# Patient Record
Sex: MALE | Race: WHITE | Employment: FULL TIME | URBAN - METROPOLITAN AREA
[De-identification: names, ages, dates, MRNs, and addresses within clinical notes are randomized per-mention and may not be internally consistent; named-entity substitution may affect disease eponyms.]

---

## 2017-01-05 ENCOUNTER — HOSPITAL ENCOUNTER (OUTPATIENT)
Dept: PHYSICAL THERAPY | Age: 62
Discharge: HOME | End: 2017-01-05
Payer: COMMERCIAL

## 2017-01-05 PROCEDURE — 97161 PT EVAL LOW COMPLEX 20 MIN: CPT

## 2017-01-05 NOTE — PROGRESS NOTES
Simeon Jeffery  : 1955 [facility] at [facility] DEONTE GARCIA  [address] [place] [address], [address] [place] [address],[address], [address] [facility]  [phone]   [phone]          OUTPATIENT PHYSICAL THERAPY:Initial Assessment 2017    ICD-10: Treatment Diagnosis: Nondisplaced fracture of right tibial tuberosity, sequela (S82.154S)  Precautions/Allergies:   Pcn [penicillins]   Fall Risk Score: 4  MD Orders: ROM, gentle quad sets, NWB R LE MEDICAL/REFERRING DIAGNOSIS:Status post fracture R tibial plateau  right tibia fx   DATE OF ONSET: 16  REFERRING PHYSICIAN: Claritza Skinner MD  RETURN PHYSICIAN APPOINTMENT:      INITIAL ASSESSMENT:  Mr. Frederick Guadalupe presents in unlocked knee brace on crutches NWB on R after a fall 16. He sustained a non-displaced tibia fracture at that time. He will be NWB for approximately 12 weeks. He will benefit from therapy to return to function due to problems listed below. Mr Frederick Guadalupe has an insurance allowance of 20 visits per year so it will be challenging for him to rehabilitate from this problem within those visits. It will require that he perform much of his exercise and activities on his own. The initial phase- until his weight bearing status changes is focused on preventing motion loss in the joints of his LE, maintaining the muscle length and slowing the muscle atrophy associated with NWB. Reaching these goals initially will expedite the long term goals. PROBLEM LIST (Impacting functional limitations):  1. Decreased Strength  2. Decreased Transfer Abilities  3. Decreased Ambulation Ability/Technique  4. Increased Pain  5. Decreased Flexibility/Joint Mobility INTERVENTIONS PLANNED:  1. Balance Exercise  2. Gait Training  3. Home Exercise Program (HEP)  4.  Therapeutic Exercise/Strengthening   TREATMENT PLAN:  Effective Dates: 17 TO 17  Frequency/Duration: 2 times a week for 2 weeks  GOALS: (Goals have been discussed and agreed upon with patient.)  Short-Term Functional Goals: Time Frame:  4 weeks  1. ROM Knee 0-120  2. Independent with HEP to address deficits. Discharge Goals: Time Frame: 4-6 months  1. Return to FWB ambulation without assistive device. 2. Strength in LE WNL to support gait and other activities. 3. ROM WNL to allow for return to all function. 4. Improved LEFS by 10 points or greater indicating improved function. Rehabilitation Potential For Stated Goals: Good  Regarding [address] [place] [address] therapy, I certify that the treatment plan above will be carried out by a therapist or under their direction. Thank you for this referral,  Andrade Sanchez PT     Referring Physician Signature: Raymon Jonas MD              Date                    HISTORY:   History of Present Injury/Illness (Reason for Referral):  Mr Claudia Anna sustained a non displaced tibial plateau fracture with a fall from a ladder 12/11/16. He has been NWB on crutches since that time. He does well with his wt bearing status and states he has not had much difficulty controlling his pain. Past Medical History/Comorbidities: cancer in the past  Social History/Living Environment:    Lives with his wife in a ranch style house with no stairs that he has to use. It does have significant acreage that he is anxious to get back to being active in. Prior Level of Function/Work/Activity:  Independent in all activities, active in his home and yard, exercises at the [facility] several times per week. Current Medications:  Claritin      Date Last Reviewed:  1/5/17   Number of Personal Factors/Comorbidities that affect the Plan of Care: 0: LOW COMPLEXITY   EXAMINATION:   Observation/Orthostatic Postural Assessment:          Swelling in LE - R knee circ- 42.5 cm     L   38.3 cm  ROM:          5- 85 degrees  Strength:          Not tested due to recent fracture. Able to perform quad sets, heel slides,   Functional Mobility:         Gait/Ambulation:  Ambulating with crutches in brace that is unlocked NWB. Balance:          Independent with NWB ambulation   Body Structures Involved:  1. Bones  2. Joints  3. Muscles  4. Ligaments Body Functions Affected:  1. Neuromusculoskeletal  2. Movement Related Activities and Participation Affected:  1. Mobility   Number of elements that affect the Plan of Care: 4+: HIGH COMPLEXITY   CLINICAL PRESENTATION:   Presentation: Stable and uncomplicated: LOW COMPLEXITY   CLINICAL DECISION MAKING:   Outcome Measure: Tool Used: Lower Extremity Functional Scale (LEFS)  Score:  Initial: 16/80 Most Recent: X/80 (Date: -- )   Interpretation of Score: 20 questions each scored on a 5 point scale with 0 representing \"extreme difficulty or unable to perform\" and 4 representing \"no difficulty\". The lower the score, the greater the functional disability. 80/80 represents no disability. Minimal detectable change is 9 points. Score 80 79-63 62-48 47-32 31-16 15-1 0   Modifier CH CI CJ CK CL CM CN         Medical Necessity:   · Patient is expected to demonstrate progress in strength, range of motion, balance, coordination and functional technique to return to independent gait and activities. .  Reason for Services/Other Comments:  · Pt will be NWB x 12 weeks and will require skilled physical therapy to advance wt bearing and return strength and ROM to previous level. .   Use of outcome tool(s) and clinical judgement create a POC that gives a: Clear prediction of patient's progress: LOW COMPLEXITY   TREATMENT:   (In addition to Assessment/Re-Assessment sessions the following treatments were rendered)  THERAPEUTIC EXERCISE: (15 minutes):  Exercises per grid below to improve mobility and strength. Required moderate visual, verbal and tactile cues to promote proper body alignment, promote proper body posture and promote proper body mechanics. Progressed repetitions as indicated.    Date:   Date:   Date:     Activity/Exercise Parameters Parameters Parameters   Dorsiflex stretch with towel 3 x 20 Heel slide in brace 10     quads 15 x 5 sec     Ankle pumps 15     ciricles 10                     Treatment/Session Assessment:  Pt did well with exercises. Expressed understanding. · Pre-treatment Symptoms:  Min pain in LE  · Pain: Initial:    3/10 pain Post Session:  3/10 pain ·   Compliance with Program/Exercises: Will assess as treatment progresses. · Recommendations/Intent for next treatment session: \"Next visit will focus on ROM and strengthening to prevent decreasing ROM and atrophy. \".   Total Treatment Duration:  60 min       Chad Eric PT

## 2017-01-09 NOTE — PROGRESS NOTES
Ambulatory/Rehab Services H2 Model Falls Risk Assessment    Risk Factor Pts. ·   Confusion/Disorientation/Impulsivity  []    4 ·   Symptomatic Depression  []   2 ·   Altered Elimination  []   1 ·   Dizziness/Vertigo  []   1 ·   Gender (Male)  []   1 ·   Any administered antiepileptics (anticonvulsants):  []   2 ·   Any administered benzodiazepines:  []   1 ·   Visual Impairment (specify):  []   1 ·   Portable Oxygen Use  []   1 ·   Orthostatic ? BP  []   1 ·   History of Recent Falls (within 3 mos.)  [x]   5     Ability to Rise from Chair (choose one) Pts. ·   Ability to rise in a single movement  []   0 ·   Pushes up, successful in one attempt  [x]   1 ·   Multiple attempts, but successful  []   3 ·   Unable to rise without assistance  []   4   Total: (5 or greater = High Risk) 6     Falls Prevention Plan:   []                Physical Limitations to Exercise (specify):   []                Mobility Assistance Device (type):   []                Exercise/Equipment Adaptation (specify):    ©2010 [facility] Patent #[id number].  Federal Law prohibits the replication, distribution or use without written permission from [facility]

## 2017-01-10 ENCOUNTER — HOSPITAL ENCOUNTER (OUTPATIENT)
Dept: PHYSICAL THERAPY | Age: 62
Discharge: HOME | End: 2017-01-10
Payer: COMMERCIAL

## 2017-01-10 NOTE — PROGRESS NOTES
Yazan Osei  : 1955 [facility] at [facility] DEONTE GARCIA  [address] [facility], [address] [place] [address],[address], [address] [facility]  [phone]   [phone]          OUTPATIENT PHYSICAL THERAPY:Daily Note 1/10/2017    ICD-10: Treatment Diagnosis: Nondisplaced fracture of right tibial tuberosity, sequela (S82.154S)  Precautions/Allergies:   Pcn [penicillins]   Fall Risk Score: 4  MD Orders: ROM, gentle quad sets, NWB R LE MEDICAL/REFERRING DIAGNOSIS:Status post fracture R tibial plateau  right tibia fx   DATE OF ONSET: 16  REFERRING PHYSICIAN: Aure Domínguez MD  RETURN PHYSICIAN APPOINTMENT:      INITIAL ASSESSMENT:  Mr. Phillip Escudero presents in unlocked knee brace on crutches NWB on R after a fall 16. He sustained a non-displaced tibia fracture at that time. He will be NWB for approximately 12 weeks. He will benefit from therapy to return to function due to problems listed below. Mr Phillip Escudero has an insurance allowance of 20 visits per year so it will be challenging for him to rehabilitate from this problem within those visits. It will require that he perform much of his exercise and activities on his own. The initial phase- until his weight bearing status changes is focused on preventing motion loss in the joints of his LE, maintaining the muscle length and slowing the muscle atrophy associated with NWB. Reaching these goals initially will expedite the long term goals. PROBLEM LIST (Impacting functional limitations):  1. Decreased Strength  2. Decreased Transfer Abilities  3. Decreased Ambulation Ability/Technique  4. Increased Pain  5. Decreased Flexibility/Joint Mobility INTERVENTIONS PLANNED:  1. Balance Exercise  2. Gait Training  3. Home Exercise Program (HEP)  4.  Therapeutic Exercise/Strengthening   TREATMENT PLAN:  Effective Dates: 17 TO 17  Frequency/Duration: 2 times a week for 2 weeks  GOALS: (Goals have been discussed and agreed upon with patient.)  Short-Term Functional Goals: Time Frame:  4 weeks  1. ROM Knee 0-120  2. Independent with HEP to address deficits. Discharge Goals: Time Frame: 4-6 months  1. Return to FWB ambulation without assistive device. 2. Strength in LE WNL to support gait and other activities. 3. ROM WNL to allow for return to all function. 4. Improved LEFS by 10 points or greater indicating improved function. Rehabilitation Potential For Stated Goals: Good  Regarding [address] [place] [address] therapy, I certify that the treatment plan above will be carried out by a therapist or under their direction. Thank you for this referral,  Sara Hull PT     Referring Physician Signature: Mehnaz Abbasi MD              Date                    HISTORY:   History of Present Injury/Illness (Reason for Referral):  Mr Xander Daniels sustained a non displaced tibial plateau fracture with a fall from a ladder 12/11/16. He has been NWB on crutches since that time. He does well with his wt bearing status and states he has not had much difficulty controlling his pain. Past Medical History/Comorbidities: cancer in the past  Social History/Living Environment:    Lives with his wife in a ranch style house with no stairs that he has to use. It does have significant acreage that he is anxious to get back to being active in. Prior Level of Function/Work/Activity:  Independent in all activities, active in his home and yard, exercises at the [facility] several times per week. Current Medications:  Claritin    Date Last Reviewed:  1/5/17   Number of Personal Factors/Comorbidities that affect the Plan of Care: 0: LOW COMPLEXITY   EXAMINATION:   Observation/Orthostatic Postural Assessment:          Swelling in LE - R knee circ- 42.5 cm     L   38.3 cm  ROM:          5- 85 degrees  Strength:          Not tested due to recent fracture. Able to perform quad sets, heel slides,   Functional Mobility:         Gait/Ambulation:  Ambulating with crutches in brace that is unlocked NWB.     Balance: Independent with NWB ambulation   Body Structures Involved:  1. Bones  2. Joints  3. Muscles  4. Ligaments Body Functions Affected:  1. Neuromusculoskeletal  2. Movement Related Activities and Participation Affected:  1. Mobility   Number of elements that affect the Plan of Care: 4+: HIGH COMPLEXITY   CLINICAL PRESENTATION:   Presentation: Stable and uncomplicated: LOW COMPLEXITY   CLINICAL DECISION MAKING:   Outcome Measure: Tool Used: Lower Extremity Functional Scale (LEFS)  Score:  Initial: 16/80 Most Recent: X/80 (Date: -- )   Interpretation of Score: 20 questions each scored on a 5 point scale with 0 representing \"extreme difficulty or unable to perform\" and 4 representing \"no difficulty\". The lower the score, the greater the functional disability. 80/80 represents no disability. Minimal detectable change is 9 points. Score 80 79-63 62-48 47-32 31-16 15-1 0   Modifier CH CI CJ CK CL CM CN     Medical Necessity:   · Patient is expected to demonstrate progress in strength, range of motion, balance, coordination and functional technique to return to independent gait and activities. .  Reason for Services/Other Comments:  · Pt will be NWB x 12 weeks and will require skilled physical therapy to advance wt bearing and return strength and ROM to previous level. .   Use of outcome tool(s) and clinical judgement create a POC that gives a: Clear prediction of patient's progress: LOW COMPLEXITY   TREATMENT:   (In addition to Assessment/Re-Assessment sessions the following treatments were rendered)    THERAPEUTIC EXERCISE: (15 minutes):  Exercises per grid below to improve mobility and strength. Required moderate visual, verbal and tactile cues to promote proper body alignment, promote proper body posture and promote proper body mechanics. Progressed repetitions as indicated. Subjective:  Pt states he continues to do well. He has been doing his exercises and has minimal discomfort with them.        Date: Date:  1/10 Date:     Activity/Exercise Parameters Parameters Parameters   Dorsiflex stretch with towel 3 x 20     Heel slide in brace 10 25    quads 15 x 5 sec     Ankle pumps 15 30    ciricles 10     Ankle 4 way with theraband  Blue band 30x    SLR 4 ways  25x    Prone HS curl  25x    Seated LAQ  25x                                Treatment/Session Assessment:  Pt did well with exercises. Given written HEP with ex above. Pt expressed understanding of ex and wt bearing status. He also stated he had been coming out of his brace but he understands now that he is to stay in the brace for exercises. · Pre-treatment Symptoms:  Min pain in LE  · Pain: Initial:  Pain Intensity 1: 2 (2/10 after session) 3/10 pain Post Session:  3/10 pain ·   Compliance with Program/Exercises: Will assess as treatment progresses. · Recommendations/Intent for next treatment session: \"Next visit will focus on ROM and strengthening to prevent decreasing ROM and atrophy. \".   Total Treatment Duration:  45 min  PT Patient Time In/Time Out  Time In: 0800  Time Out: 1042    Adina Blood, PT

## 2017-01-12 ENCOUNTER — HOSPITAL ENCOUNTER (OUTPATIENT)
Dept: PHYSICAL THERAPY | Age: 62
Discharge: HOME | End: 2017-01-12
Payer: COMMERCIAL

## 2017-01-12 PROCEDURE — 97110 THERAPEUTIC EXERCISES: CPT

## 2017-01-12 NOTE — PROGRESS NOTES
Thuy Class  : 1955 Therapy Center at [facility] DEONTE GARCIA  [address] [facility], [address] [place] [address],[address], [facility] [address]  [phone]   [phone]          OUTPATIENT PHYSICAL THERAPY:Daily Note 2017    ICD-10: Treatment Diagnosis: Nondisplaced fracture of right tibial tuberosity, sequela (S82.154S)  Precautions/Allergies:   Pcn [penicillins]   Fall Risk Score: 4  MD Orders: ROM, gentle quad sets, NWB R LE MEDICAL/REFERRING DIAGNOSIS:Status post fracture R tibial plateau  right tibia fx   DATE OF ONSET: 16  REFERRING PHYSICIAN: Tata Prieto MD  RETURN PHYSICIAN APPOINTMENT:      INITIAL ASSESSMENT:  Mr. Bela Haider presents in unlocked knee brace on crutches NWB on R after a fall 16. He sustained a non-displaced tibia fracture at that time. He will be NWB for approximately 12 weeks. He will benefit from therapy to return to function due to problems listed below. Mr Bela Haider has an insurance allowance of 20 visits per year so it will be challenging for him to rehabilitate from this problem within those visits. It will require that he perform much of his exercise and activities on his own. The initial phase- until his weight bearing status changes is focused on preventing motion loss in the joints of his LE, maintaining the muscle length and slowing the muscle atrophy associated with NWB. Reaching these goals initially will expedite the long term goals. PROBLEM LIST (Impacting functional limitations):  1. Decreased Strength  2. Decreased Transfer Abilities  3. Decreased Ambulation Ability/Technique  4. Increased Pain  5. Decreased Flexibility/Joint Mobility INTERVENTIONS PLANNED:  1. Balance Exercise  2. Gait Training  3. Home Exercise Program (HEP)  4.  Therapeutic Exercise/Strengthening   TREATMENT PLAN:  Effective Dates: 17 TO 17  Frequency/Duration: 2 times a week for 2 weeks  GOALS: (Goals have been discussed and agreed upon with patient.)  Short-Term Functional Goals: Time Frame:  4 weeks  1. ROM Knee 0-120  2. Independent with HEP to address deficits. Discharge Goals: Time Frame: 4-6 months  1. Return to FWB ambulation without assistive device. 2. Strength in LE WNL to support gait and other activities. 3. ROM WNL to allow for return to all function. 4. Improved LEFS by 10 points or greater indicating improved function. Rehabilitation Potential For Stated Goals: Good  Regarding [address] [place] [address] therapy, I certify that the treatment plan above will be carried out by a therapist or under their direction. Thank you for this referral,  Devyn Acevedo PT     Referring Physician Signature: Shavonne Delgado MD              Date                    HISTORY:   History of Present Injury/Illness (Reason for Referral):  Mr Gabriel Tapia sustained a non displaced tibial plateau fracture with a fall from a ladder 12/11/16. He has been NWB on crutches since that time. He does well with his wt bearing status and states he has not had much difficulty controlling his pain. Past Medical History/Comorbidities: cancer in the past  Social History/Living Environment:    Lives with his wife in a ranch style house with no stairs that he has to use. It does have significant acreage that he is anxious to get back to being active in. Prior Level of Function/Work/Activity:  Independent in all activities, active in his home and yard, exercises at the [facility] several times per week. Current Medications:  Claritin    Date Last Reviewed:  1/12/17   Number of Personal Factors/Comorbidities that affect the Plan of Care: 0: LOW COMPLEXITY   EXAMINATION:   Observation/Orthostatic Postural Assessment:          Swelling in LE - R knee circ- 42.5 cm     L   38.3 cm  ROM:          5- 85 degrees  Strength:          Not tested due to recent fracture. Able to perform quad sets, heel slides,   Functional Mobility:         Gait/Ambulation:  Ambulating with crutches in brace that is unlocked NWB.     Balance: Independent with NWB ambulation   Body Structures Involved:  1. Bones  2. Joints  3. Muscles  4. Ligaments Body Functions Affected:  1. Neuromusculoskeletal  2. Movement Related Activities and Participation Affected:  1. Mobility   Number of elements that affect the Plan of Care: 4+: HIGH COMPLEXITY   CLINICAL PRESENTATION:   Presentation: Stable and uncomplicated: LOW COMPLEXITY   CLINICAL DECISION MAKING:   Outcome Measure: Tool Used: Lower Extremity Functional Scale (LEFS)  Score:  Initial: 16/80 Most Recent: X/80 (Date: -- )   Interpretation of Score: 20 questions each scored on a 5 point scale with 0 representing \"extreme difficulty or unable to perform\" and 4 representing \"no difficulty\". The lower the score, the greater the functional disability. 80/80 represents no disability. Minimal detectable change is 9 points. Score 80 79-63 62-48 47-32 31-16 15-1 0   Modifier CH CI CJ CK CL CM CN     Medical Necessity:   · Patient is expected to demonstrate progress in strength, range of motion, balance, coordination and functional technique to return to independent gait and activities. .  Reason for Services/Other Comments:  · Pt will be NWB x 12 weeks and will require skilled physical therapy to advance wt bearing and return strength and ROM to previous level. .   Use of outcome tool(s) and clinical judgement create a POC that gives a: Clear prediction of patient's progress: LOW COMPLEXITY   TREATMENT:   (In addition to Assessment/Re-Assessment sessions the following treatments were rendered)    THERAPEUTIC EXERCISE: (15 minutes):  Exercises per grid below to improve mobility and strength. Required moderate visual, verbal and tactile cues to promote proper body alignment, promote proper body posture and promote proper body mechanics. Progressed repetitions as indicated. Subjective:  Pt states he is doing his exercises at home and they are getting a bit easier.   No real pain       Date:   Date:  1/10 Date:  1/12   Activity/Exercise Parameters Parameters Parameters   Dorsiflex stretch with towel 3 x 20     Heel slide in brace 10 25 25   quads 15 x 5 sec  25   Ankle pumps 15 30    ciricles 10     Ankle 4 way with theraband  Blue band 30x Blue band 30x   SLR 4 ways  25x 25x 2# with ext   Prone HS curl  25x 25# 2#   Seated LAQ  25x 25   Seated march   15   Hip add in hooklying   20                   Treatment/Session Assessment:  Pt did well with exercises. He continues to be compliant with HEP and wt bearing status. · Pre-treatment Symptoms:  Min pain in LE  · Pain: Initial:  Pain Intensity 1: 2 (2/10 after session) 2/10 pain Post Session: 2/10 pain ·   Compliance with Program/Exercises: Will assess as treatment progresses. · Recommendations/Intent for next treatment session: \"Next visit will focus on ROM and strengthening to prevent decreasing ROM and atrophy. \".   Total Treatment Duration:  45 min  PT Patient Time In/Time Out  Time In: 0800  Time Out: 1614    Kory Golden PT

## 2017-01-17 ENCOUNTER — HOSPITAL ENCOUNTER (OUTPATIENT)
Dept: PHYSICAL THERAPY | Age: 62
Discharge: HOME | End: 2017-01-17
Payer: COMMERCIAL

## 2017-01-17 PROCEDURE — 97110 THERAPEUTIC EXERCISES: CPT

## 2017-01-17 NOTE — PROGRESS NOTES
Sheryle Miguel  : 1955 [facility] DEONTE GARCIA  1101 [facility], [address] [place] [address],[address], [place] [address].  [phone]   [phone]          OUTPATIENT PHYSICAL THERAPY:Daily Note 2017    ICD-10: Treatment Diagnosis: Nondisplaced fracture of right tibial tuberosity, sequela (S82.154S)  Precautions/Allergies:   Pcn [penicillins]   Fall Risk Score: 4  MD Orders: ROM, gentle quad sets, NWB R LE MEDICAL/REFERRING DIAGNOSIS:Status post fracture R tibial plateau  right tibia fx   DATE OF ONSET: 16  REFERRING PHYSICIAN: Juana Myers MD  RETURN PHYSICIAN APPOINTMENT:      INITIAL ASSESSMENT:  Mr. Nisa Rokc presents in unlocked knee brace on crutches NWB on R after a fall 16. He sustained a non-displaced tibia fracture at that time. He will be NWB for approximately 12 weeks. He will benefit from therapy to return to function due to problems listed below. Mr Nisa Rock has an insurance allowance of 20 visits per year so it will be challenging for him to rehabilitate from this problem within those visits. It will require that he perform much of his exercise and activities on his own. The initial phase- until his weight bearing status changes is focused on preventing motion loss in the joints of his LE, maintaining the muscle length and slowing the muscle atrophy associated with NWB. Reaching these goals initially will expedite the long term goals. PROBLEM LIST (Impacting functional limitations):  1. Decreased Strength  2. Decreased Transfer Abilities  3. Decreased Ambulation Ability/Technique  4. Increased Pain  5. Decreased Flexibility/Joint Mobility INTERVENTIONS PLANNED:  1. Balance Exercise  2. Gait Training  3. Home Exercise Program (HEP)  4.  Therapeutic Exercise/Strengthening   TREATMENT PLAN:  Effective Dates: 17 TO 17  Frequency/Duration: 2 times a week for 2 weeks  GOALS: (Goals have been discussed and agreed upon with patient.)  Short-Term Functional Goals: Time Frame:  4 weeks  1. ROM Knee 0-120  2. Independent with HEP to address deficits. Discharge Goals: Time Frame: 4-6 months  1. Return to FWB ambulation without assistive device. 2. Strength in LE WNL to support gait and other activities. 3. ROM WNL to allow for return to all function. 4. Improved LEFS by 10 points or greater indicating improved function. Rehabilitation Potential For Stated Goals: Good  Regarding [address] [place] [address] therapy, I certify that the treatment plan above will be carried out by a therapist or under their direction. Thank you for this referral,  Veronica Jarvsi PT     Referring Physician Signature: Juana Myers MD              Date                    HISTORY:   History of Present Injury/Illness (Reason for Referral):  Mr Nisa Rock sustained a non displaced tibial plateau fracture with a fall from a ladder 12/11/16. He has been NWB on crutches since that time. He does well with his wt bearing status and states he has not had much difficulty controlling his pain. Past Medical History/Comorbidities: cancer in the past  Social History/Living Environment:    Lives with his wife in a ranch style house with no stairs that he has to use. It does have significant acreage that he is anxious to get back to being active in. Prior Level of Function/Work/Activity:  Independent in all activities, active in his home and yard, exercises at the [facility] several times per week. Current Medications:  Claritin    Date Last Reviewed:  1/17/17   Number of Personal Factors/Comorbidities that affect the Plan of Care: 0: LOW COMPLEXITY   EXAMINATION:   Observation/Orthostatic Postural Assessment:          Swelling in LE - R knee circ- 42.5 cm     L   38.3 cm  ROM:          5- 85 degrees  Strength:          Not tested due to recent fracture. Able to perform quad sets, heel slides,   Functional Mobility:         Gait/Ambulation:  Ambulating with crutches in brace that is unlocked NWB.     Balance: Independent with NWB ambulation   Body Structures Involved:  1. Bones  2. Joints  3. Muscles  4. Ligaments Body Functions Affected:  1. Neuromusculoskeletal  2. Movement Related Activities and Participation Affected:  1. Mobility   Number of elements that affect the Plan of Care: 4+: HIGH COMPLEXITY   CLINICAL PRESENTATION:   Presentation: Stable and uncomplicated: LOW COMPLEXITY   CLINICAL DECISION MAKING:   Outcome Measure: Tool Used: Lower Extremity Functional Scale (LEFS)  Score:  Initial: 16/80 Most Recent: X/80 (Date: -- )   Interpretation of Score: 20 questions each scored on a 5 point scale with 0 representing \"extreme difficulty or unable to perform\" and 4 representing \"no difficulty\". The lower the score, the greater the functional disability. 80/80 represents no disability. Minimal detectable change is 9 points. Score 80 79-63 62-48 47-32 31-16 15-1 0   Modifier CH CI CJ CK CL CM CN     Medical Necessity:   · Patient is expected to demonstrate progress in strength, range of motion, balance, coordination and functional technique to return to independent gait and activities. .  Reason for Services/Other Comments:  · Pt will be NWB x 12 weeks and will require skilled physical therapy to advance wt bearing and return strength and ROM to previous level. .   Use of outcome tool(s) and clinical judgement create a POC that gives a: Clear prediction of patient's progress: LOW COMPLEXITY   TREATMENT:   (In addition to Assessment/Re-Assessment sessions the following treatments were rendered)    THERAPEUTIC EXERCISE: (45 minutes):  Exercises per grid below to improve mobility and strength. Required moderate visual, verbal and tactile cues to promote proper body alignment, promote proper body posture and promote proper body mechanics. Progressed repetitions as indicated. Subjective:  Pt states he continues to be compliant with HEP and is having minimal to no pain.        Date:   Date:  1/10 Date:  1/12 Date  1/17   Activity/Exercise Parameters Parameters Parameters    Dorsiflex stretch with towel 3 x 20   3 x 30 sec   Heel slide in brace 10 25 25    quads 15 x 5 sec  25 25x   Ankle pumps 15 30     ciricles 10      Ankle 4 way with theraband  Blue band 30x Blue band 30x Blue band 40x   SLR 4 ways  25x 25x 2# with ext 2.5 # 40x   Prone HS curl  25x 25# 2# 2.5# 40x   Seated LAQ  25x 25 2.5# 40x   Seated march   15 2.5# 25x   Hip add in hooklying   20    Quadruped oppos extrem ext for R LE    15x   HS stretch with strap    5 x 30 sec       Treatment/Session Assessment:  Pt did well with exercises. He continues to be compliant with HEP and wt bearing status. · Pre-treatment Symptoms:  Min pain in LE  · Pain: Initial:  Pain Intensity 1: 0 (0/10 at end of session) 0/10 pain Post Session: 0/10 pain ·   Compliance with Program/Exercises: Will assess as treatment progresses. · Recommendations/Intent for next treatment session: \"Next visit will focus on ROM and strengthening to prevent decreasing ROM and atrophy. \".   Total Treatment Duration:  45 min  PT Patient Time In/Time Out  Time In: 0800  Time Out: 3573    Danni Pino, PT

## 2017-01-19 ENCOUNTER — HOSPITAL ENCOUNTER (OUTPATIENT)
Dept: PHYSICAL THERAPY | Age: 62
Discharge: HOME | End: 2017-01-19
Payer: COMMERCIAL

## 2017-01-19 NOTE — PROGRESS NOTES
Pt cancelled appointment in consultation with PT in order to conserve his appointments as he has 20 visits covered by his insurance per year. He is independent with HEP and will be seen on Tuesday to monitor ROM and progress.

## 2017-01-24 ENCOUNTER — HOSPITAL ENCOUNTER (OUTPATIENT)
Dept: PHYSICAL THERAPY | Age: 62
Discharge: HOME | End: 2017-01-24
Payer: COMMERCIAL

## 2017-01-24 PROCEDURE — 97110 THERAPEUTIC EXERCISES: CPT

## 2017-01-24 NOTE — PROGRESS NOTES
Dominik Para  : 1955 [facility] at [facility] DEONTE GARCIA  [address] [facility], [address] [place] [address],[address], [place] [address].  [phone]   [phone]          OUTPATIENT PHYSICAL THERAPY:Daily Note 2017    ICD-10: Treatment Diagnosis: Nondisplaced fracture of right tibial tuberosity, sequela (S82.154S)  Precautions/Allergies:   Pcn [penicillins]   Fall Risk Score: 4  MD Orders: ROM, gentle quad sets, NWB R LE MEDICAL/REFERRING DIAGNOSIS:Status post fracture R tibial plateau  right tibia fx   DATE OF ONSET: 16  REFERRING PHYSICIAN: Anjali Aquino MD  RETURN PHYSICIAN APPOINTMENT:      INITIAL ASSESSMENT:  Mr. Reuben Burton presents in unlocked knee brace on crutches NWB on R after a fall 16. He sustained a non-displaced tibia fracture at that time. He will be NWB for approximately 12 weeks. He will benefit from therapy to return to function due to problems listed below. Mr Reuben Burton has an insurance allowance of 20 visits per year so it will be challenging for him to rehabilitate from this problem within those visits. It will require that he perform much of his exercise and activities on his own. The initial phase- until his weight bearing status changes is focused on preventing motion loss in the joints of his LE, maintaining the muscle length and slowing the muscle atrophy associated with NWB. Reaching these goals initially will expedite the long term goals. PROBLEM LIST (Impacting functional limitations):  1. Decreased Strength  2. Decreased Transfer Abilities  3. Decreased Ambulation Ability/Technique  4. Increased Pain  5. Decreased Flexibility/Joint Mobility INTERVENTIONS PLANNED:  1. Balance Exercise  2. Gait Training  3. Home Exercise Program (HEP)  4.  Therapeutic Exercise/Strengthening   TREATMENT PLAN:  Effective Dates: 17 TO 17  Frequency/Duration: 2 times a week for 2 weeks  GOALS: (Goals have been discussed and agreed upon with patient.)  Short-Term Functional Goals: Time Frame:  4 weeks  1. ROM Knee 0-120  2. Independent with HEP to address deficits. Discharge Goals: Time Frame: 4-6 months  1. Return to FWB ambulation without assistive device. 2. Strength in LE WNL to support gait and other activities. 3. ROM WNL to allow for return to all function. 4. Improved LEFS by 10 points or greater indicating improved function. Rehabilitation Potential For Stated Goals: Good  Regarding [address] [place] [address] therapy, I certify that the treatment plan above will be carried out by a therapist or under their direction. Thank you for this referral,  Devyn Acevedo PT     Referring Physician Signature: Shavonne Delgado MD              Date                    HISTORY:   History of Present Injury/Illness (Reason for Referral):  Mr Gabriel Tapia sustained a non displaced tibial plateau fracture with a fall from a ladder 12/11/16. He has been NWB on crutches since that time. He does well with his wt bearing status and states he has not had much difficulty controlling his pain. Past Medical History/Comorbidities: cancer in the past  Social History/Living Environment:    Lives with his wife in a ranch style house with no stairs that he has to use. It does have significant acreage that he is anxious to get back to being active in. Prior Level of Function/Work/Activity:  Independent in all activities, active in his home and yard, exercises at the [facility] several times per week. Current Medications:  Claritin    Date Last Reviewed:  1/24/17   Number of Personal Factors/Comorbidities that affect the Plan of Care: 0: LOW COMPLEXITY   EXAMINATION:   Observation/Orthostatic Postural Assessment:          Swelling in LE - R knee circ- 42.5 cm     L   38.3 cm  ROM:          3-120 degrees- 1/24/17  Strength:          Not tested due to recent fracture. Able to perform quad sets, heel slides,   Functional Mobility:         Gait/Ambulation:  Ambulating with crutches in brace that is unlocked NWB. Balance:          Independent with NWB ambulation   Body Structures Involved:  1. Bones  2. Joints  3. Muscles  4. Ligaments Body Functions Affected:  1. Neuromusculoskeletal  2. Movement Related Activities and Participation Affected:  1. Mobility   Number of elements that affect the Plan of Care: 4+: HIGH COMPLEXITY   CLINICAL PRESENTATION:   Presentation: Stable and uncomplicated: LOW COMPLEXITY   CLINICAL DECISION MAKING:   Outcome Measure: Tool Used: Lower Extremity Functional Scale (LEFS)  Score:  Initial: 16/80 Most Recent: X/80 (Date: -- )   Interpretation of Score: 20 questions each scored on a 5 point scale with 0 representing \"extreme difficulty or unable to perform\" and 4 representing \"no difficulty\". The lower the score, the greater the functional disability. 80/80 represents no disability. Minimal detectable change is 9 points. Score 80 79-63 62-48 47-32 31-16 15-1 0   Modifier CH CI CJ CK CL CM CN     Medical Necessity:   · Patient is expected to demonstrate progress in strength, range of motion, balance, coordination and functional technique to return to independent gait and activities. .  Reason for Services/Other Comments:  · Pt will be NWB x 12 weeks and will require skilled physical therapy to advance wt bearing and return strength and ROM to previous level. .   Use of outcome tool(s) and clinical judgement create a POC that gives a: Clear prediction of patient's progress: LOW COMPLEXITY   TREATMENT:   (In addition to Assessment/Re-Assessment sessions the following treatments were rendered)    THERAPEUTIC EXERCISE: (45 minutes):  Exercises per grid below to improve mobility and strength. Required minimal visual and verbal cues to promote proper body alignment, promote proper body posture and promote proper body mechanics. Progressed resistance, range and repetitions as indicated. Subjective:  Pt states he is using a 2# wt at home for his HEP.          Date:   Date:  1/10 Date:  1/12 Date  1/17 Date  1/24   Activity/Exercise Parameters Parameters Parameters     Dorsiflex stretch with towel 3 x 20   3 x 30 sec Using stretch strap   Heel slide in brace 10 25 25     quads 15 x 5 sec  25 25x 20x   Ankle pumps 15 30   With overstretch 15x   ciricles 10       Ankle 4 way with theraband  Blue band 30x Blue band 30x Blue band 40x Blue band 40x   SLR 4 ways  25x 25x 2# with ext 2.5 # 40x 2.5 # 30x   Prone HS curl  25x 25# 2# 2.5# 40x 2.5 # 40x   Seated LAQ  25x 25 2.5# 40x 4# 30X   Seated march   15 2.5# 25x    Hip add in hooklying   20     Quadruped oppos extrem ext for R LE    15x 30x   HS stretch with strap    5 x 30 sec 5 x 30 with stretch strap       Treatment/Session Assessment:  Pt continues to do well , working diligently on his HEP. ROM out of brace measured today 3-120 degrees. Pt is not to be out of brace at home for any ex and he understands this. He is compliant with he NWB status. · Pre-treatment Symptoms:  Min pain in LE  · Pain: Initial:  Pain Intensity 1: 1 (1/10 after session)  Post Session:1/10 pain ·   Compliance with Program/Exercises: Will assess as treatment progresses. · Recommendations/Intent for next treatment session: \"Next visit will focus on ROM and strengthening to prevent decreasing ROM and atrophy. \".   Total Treatment Duration:  45 min  PT Patient Time In/Time Out  Time In: 0800  Time Out: 3970    Kory Golden PT

## 2017-01-26 ENCOUNTER — HOSPITAL ENCOUNTER (OUTPATIENT)
Dept: PHYSICAL THERAPY | Age: 62
Discharge: HOME | End: 2017-01-26
Payer: COMMERCIAL

## 2017-01-26 PROCEDURE — 97140 MANUAL THERAPY 1/> REGIONS: CPT

## 2017-01-26 NOTE — PROGRESS NOTES
Lisbeth Ronaldo  : 1955 [facility] at [facility] DEONTE GARCIA  1101 [facility], [address] [place] [address],[address], [address] [facility]  [phone]   [phone]          OUTPATIENT PHYSICAL THERAPY:Daily Note 2017    ICD-10: Treatment Diagnosis: Nondisplaced fracture of right tibial tuberosity, sequela (S82.154S)  Precautions/Allergies:   Pcn [penicillins]   Fall Risk Score: 4  MD Orders: ROM, gentle quad sets, NWB R LE MEDICAL/REFERRING DIAGNOSIS:Status post fracture R tibial plateau  DATE OF ONSET: 16  REFERRING PHYSICIAN: Mehnaz Abbasi MD  RETURN PHYSICIAN APPOINTMENT:      INITIAL ASSESSMENT:  Mr. Xander Daniels presents in unlocked knee brace on crutches NWB on R after a fall 16. He sustained a non-displaced tibia fracture at that time. He will be NWB for approximately 12 weeks. He will benefit from therapy to return to function due to problems listed below. Mr Xander Daniels has an insurance allowance of 20 visits per year so it will be challenging for him to rehabilitate from this problem within those visits. It will require that he perform much of his exercise and activities on his own. The initial phase- until his weight bearing status changes is focused on preventing motion loss in the joints of his LE, maintaining the muscle length and slowing the muscle atrophy associated with NWB. Reaching these goals initially will expedite the long term goals. PROBLEM LIST (Impacting functional limitations):  1. Decreased Strength  2. Decreased Transfer Abilities  3. Decreased Ambulation Ability/Technique  4. Increased Pain  5. Decreased Flexibility/Joint Mobility INTERVENTIONS PLANNED:  1. Balance Exercise  2. Gait Training  3. Home Exercise Program (HEP)  4.  Therapeutic Exercise/Strengthening   TREATMENT PLAN:  Effective Dates: 17 TO 17  Frequency/Duration: 2 times a week for 2 weeks  GOALS: (Goals have been discussed and agreed upon with patient.)  Short-Term Functional Goals: Time Frame: 4 weeks  1. ROM Knee 0-120  2. Independent with HEP to address deficits. Discharge Goals: Time Frame: 4-6 months  1. Return to FWB ambulation without assistive device. 2. Strength in LE WNL to support gait and other activities. 3. ROM WNL to allow for return to all function. 4. Improved LEFS by 10 points or greater indicating improved function. Rehabilitation Potential For Stated Goals: Yon Cleary, PT                 HISTORY:   History of Present Injury/Illness (Reason for Referral):  Mr Lyndsay Liriano sustained a non displaced tibial plateau fracture with a fall from a ladder 12/11/16. He has been NWB on crutches since that time. He does well with his wt bearing status and states he has not had much difficulty controlling his pain. Past Medical History/Comorbidities: cancer in the past  Social History/Living Environment:    Lives with his wife in a ranch style house with no stairs that he has to use. It does have significant acreage that he is anxious to get back to being active in. Prior Level of Function/Work/Activity:  Independent in all activities, active in his home and yard, exercises at the [facility] several times per week. Current Medications:  Claritin    Date Last Reviewed:  1/24/17   EXAMINATION:   Observation/Orthostatic Postural Assessment:  No new        ROM:          3-120 degrees- 1/24/17        AROM R knee 0-123 on 1/26/17  Strength:          Not tested due to recent fracture. Functional Mobility:         Gait/Ambulation:  Ambulating with crutches in brace NWB with crutches. Balance:        Independent with NWB ambulation   Body Structures Involved:  1. Bones  2. Joints  3. Muscles  4. Ligaments Body Functions Affected:  1. Neuromusculoskeletal  2. Movement Related Activities and Participation Affected:  1. Mobility   CLINICAL DECISION MAKING:   Outcome Measure:    Tool Used: Lower Extremity Functional Scale (LEFS)  Score:  Initial: 16/80 Most Recent: X/80 (Date: -- ) Interpretation of Score: 20 questions each scored on a 5 point scale with 0 representing \"extreme difficulty or unable to perform\" and 4 representing \"no difficulty\". The lower the score, the greater the functional disability. 80/80 represents no disability. Minimal detectable change is 9 points. Score 80 79-63 62-48 47-32 31-16 15-1 0   Modifier CH CI CJ CK CL CM CN     Medical Necessity:   · Patient is expected to demonstrate progress in strength, range of motion, balance, coordination and functional technique to return to independent gait and activities. .  Reason for Services/Other Comments:  · Pt will be NWB x 12 weeks and will require skilled physical therapy to advance wt bearing and return strength and ROM to previous level. .   TREATMENT:   (In addition to Assessment/Re-Assessment sessions the following treatments were rendered)    Subjective:  Pt reports his knee continues to do well. He is trying to conserve PT visits. Therapeutic Exercise: ( ):  none today       Date:   Date:  1/10 Date:  1/12 Date  1/17 Date  1/24   Activity/Exercise Parameters Parameters Parameters     Dorsiflex stretch with towel 3 x 20   3 x 30 sec Using stretch strap   Heel slide in brace 10 25 25     quads 15 x 5 sec  25 25x 20x   Ankle pumps 15 30   With overstretch 15x   ciricles 10       Ankle 4 way with theraband  Blue band 30x Blue band 30x Blue band 40x Blue band 40x   SLR 4 ways  25x 25x 2# with ext 2.5 # 40x 2.5 # 30x   Prone HS curl  25x 25# 2# 2.5# 40x 2.5 # 40x   Seated LAQ  25x 25 2.5# 40x 4# 30X   Seated march   15 2.5# 25x    Hip add in hooklying   20     Quadruped oppos extrem ext for R LE    15x 30x   HS stretch with strap    5 x 30 sec 5 x 30 with stretch strap   . Manual Therapy (30 minutes) - for increased ROM in R knee - grade 2 to 4- physio mobs R knee flex and extn with emphasis on extn. Grade 2-3 patellar glides in all directions.      Treatment/Session Assessment:  Pt continues to do well and AROM is good. He returns to MD next week and will inform PT of plans. Alex Griffiths He is compliant with he NWB status. · Pre-treatment Symptoms:  Min pain in LE  · Pain: Initial:  Pain Intensity 1: 1  Post Session: No increase in pain reported. ·   Compliance with Program/Exercises: yes  · Recommendations/Intent for next treatment session: \"Next visit will focus on ROM and strengthening to prevent decreasing ROM and atrophy. \".   Total Treatment Duration:  30 min  PT Patient Time In/Time Out  Time In: 0810  Time Out: 0845    Anmol Montes De Oca, PT

## 2017-01-27 NOTE — PROGRESS NOTES
Marlyn Landers  : 1955 [facility] at [facility] DEONTE GARCIA  1101 [facility], [address] [place] [address],[address], [place] [address].  [phone]   [phone]      Outpatient PHYSICAL THERAPY: PHYSICIAN COMMUNICATION    REFERRING PHYSICIAN: Zhane Knight MD  Return Physician Appointment  2017  MEDICAL/REFERRING DIAGNOSIS:  · right tibia fx  ATTENDANCE: Marlyn Landers has attended 6 out of 6 visits, with 0 cancellation(s) and 0 no shows. ASSESSMENT:  DATE: 2017    PROGRESS: Marlyn Landers continues to do well with HEP and wt bearing status. Knee ROM 0-123 degrees. RECOMMENDATIONS: Mr Roseann Marquez is trying to conserve his PT visits as he only has 20/year approved. At this point he is independent with his HEP and could continue at home with less frequent PT until wt bearing status changes.     Thank you for this referral,  Ruben Garg, PT

## 2017-02-01 ENCOUNTER — HOSPITAL ENCOUNTER (OUTPATIENT)
Dept: PHYSICAL THERAPY | Age: 62
Discharge: HOME | End: 2017-02-01
Payer: COMMERCIAL

## 2017-02-01 ENCOUNTER — APPOINTMENT (OUTPATIENT)
Dept: PHYSICAL THERAPY | Age: 62
End: 2017-02-01
Payer: COMMERCIAL

## 2017-02-01 PROCEDURE — 97140 MANUAL THERAPY 1/> REGIONS: CPT

## 2017-02-01 NOTE — PROGRESS NOTES
Madelaine Patten  : 1955 [facility] [place]  [address] [facility], [address] [place] [address],[address], [place] [address].  [phone]   [phone]          OUTPATIENT PHYSICAL THERAPY:Daily Note 2017    ICD-10: Treatment Diagnosis: Nondisplaced fracture of right tibial tuberosity, sequela (S82.154S)  Precautions/Allergies: Pcn [penicillins]   Fall Risk Score: 4  MD Orders: Continue PT NWB R LE x 1 wk, then advance to 10-20 kilos x 2 wks. If no pain, can then advance to 50% WB in brace. MEDICAL/REFERRING DIAGNOSIS:Status post fracture R tibial plateau  DATE OF ONSET: 16  REFERRING PHYSICIAN: Danielle Elliott MD  RETURN PHYSICIAN APPOINTMENT: 17     INITIAL ASSESSMENT:  Mr. Cesilia Estrada presents in unlocked knee brace on crutches NWB on R after a fall 16. He sustained a non-displaced tibia fracture at that time. He will be NWB for approximately 12 weeks. He will benefit from therapy to return to function due to problems listed below. Mr Cesilia Estrada has an insurance allowance of 20 visits per year so it will be challenging for him to rehabilitate from this problem within those visits. It will require that he perform much of his exercise and activities on his own. The initial phase- until his weight bearing status changes is focused on preventing motion loss in the joints of his LE, maintaining the muscle length and slowing the muscle atrophy associated with NWB. Reaching these goals initially will expedite the long term goals. PROBLEM LIST (Impacting functional limitations):  1. Decreased Strength  2. Decreased Transfer Abilities  3. Decreased Ambulation Ability/Technique  4. Increased Pain  5. Decreased Flexibility/Joint Mobility INTERVENTIONS PLANNED:  1. Balance Exercise  2. Gait Training  3. Home Exercise Program (HEP)  4.  Therapeutic Exercise/Strengthening   TREATMENT PLAN:  Effective Dates: 17 TO 17  Frequency/Duration: 2 times a week for 2 weeks  GOALS: (Goals have been discussed and agreed upon with patient.)  Short-Term Functional Goals: Time Frame:  4 weeks  1. ROM Knee 0-120  2. Independent with HEP to address deficits. Discharge Goals: Time Frame: 4-6 months  1. Return to FWB ambulation without assistive device. 2. Strength in LE WNL to support gait and other activities. 3. ROM WNL to allow for return to all function. 4. Improved LEFS by 10 points or greater indicating improved function. Rehabilitation Potential For Stated Goals: Yon Mclaughlin, PT                 HISTORY:   History of Present Injury/Illness (Reason for Referral):  Mr Flori French sustained a non displaced tibial plateau fracture with a fall from a ladder 12/11/16. He has been NWB on crutches since that time. He does well with his wt bearing status and states he has not had much difficulty controlling his pain. Past Medical History/Comorbidities: cancer in the past  Social History/Living Environment:    Lives with his wife in a ranch style house with no stairs that he has to use. It does have significant acreage that he is anxious to get back to being active in. Prior Level of Function/Work/Activity:  Independent in all activities, active in his home and yard, exercises at the [facility] several times per week. Current Medications:  Claritin    Date Last Reviewed:  1/24/17   EXAMINATION:   Observation/Orthostatic Postural Assessment:  No new        ROM:          3-120 degrees- 1/24/17        AROM R knee 0-123 on 1/26/17  Strength:          Not tested due to recent fracture. Functional Mobility:         Gait/Ambulation:  Ambulating with crutches in brace NWB with crutches. Balance:        Independent with NWB ambulation   Body Structures Involved:  1. Bones  2. Joints  3. Muscles  4. Ligaments Body Functions Affected:  1. Neuromusculoskeletal  2. Movement Related Activities and Participation Affected:  1. Mobility   CLINICAL DECISION MAKING:   Outcome Measure:    Tool Used: Lower Extremity Functional Scale (LEFS)  Score:  Initial: 16/80 Most Recent: X/80 (Date: -- )   Interpretation of Score: 20 questions each scored on a 5 point scale with 0 representing \"extreme difficulty or unable to perform\" and 4 representing \"no difficulty\". The lower the score, the greater the functional disability. 80/80 represents no disability. Minimal detectable change is 9 points. Score 80 79-63 62-48 47-32 31-16 15-1 0   Modifier CH CI CJ CK CL CM CN     Medical Necessity:   · Patient is expected to demonstrate progress in strength, range of motion, balance, coordination and functional technique to return to independent gait and activities. .  Reason for Services/Other Comments:  · Pt will be NWB x 12 weeks and will require skilled physical therapy to advance wt bearing and return strength and ROM to previous level. .   TREATMENT:   (In addition to Assessment/Re-Assessment sessions the following treatments were rendered)    Subjective:  Pt reports he returned to MD and he was pleased with motion. He has new orders which allow for partial WB to start next week. (see above). He had many questions regarding progression of WBing. Therapeutic Exercise: ( ):  none today       Date:   Date:  1/10 Date:  1/12 Date  1/17 Date  1/24   Activity/Exercise Parameters Parameters Parameters     Dorsiflex stretch with towel 3 x 20   3 x 30 sec Using stretch strap   Heel slide in brace 10 25 25     quads 15 x 5 sec  25 25x 20x   Ankle pumps 15 30   With overstretch 15x   ciricles 10       Ankle 4 way with theraband  Blue band 30x Blue band 30x Blue band 40x Blue band 40x   SLR 4 ways  25x 25x 2# with ext 2.5 # 40x 2.5 # 30x   Prone HS curl  25x 25# 2# 2.5# 40x 2.5 # 40x   Seated LAQ  25x 25 2.5# 40x 4# 30X   Seated march   15 2.5# 25x    Hip add in hooklying   20     Quadruped oppos extrem ext for R LE    15x 30x   HS stretch with strap    5 x 30 sec 5 x 30 with stretch strap   . Manual Therapy (30 minutes) - for increased ROM in R knee - grade 2 to 4- physio mobs R knee flex and extn with emphasis on extn. Grade 2-3 patellar glides in all directions. Treatment/Session Assessment:  Pt continues to do well and reports that MD was pleased with progress. He can begin 10-20 kilos WBing next week. · Pre-treatment Symptoms:  Min pain in LE  · Pain: Initial:  Pain Intensity 1: 1  Post Session: No increase in pain reported. ·   Compliance with Program/Exercises: yes  · Recommendations/Intent for next treatment session: \"Next visit will focus on ROM and strengthening to prevent decreasing ROM and atrophy. \".   Total Treatment Duration:  30 min  PT Patient Time In/Time Out  Time In: 0732  Time Out: 0805    Licha Amato PT

## 2017-02-07 ENCOUNTER — HOSPITAL ENCOUNTER (OUTPATIENT)
Dept: PHYSICAL THERAPY | Age: 62
Discharge: HOME | End: 2017-02-07
Payer: COMMERCIAL

## 2017-02-07 PROCEDURE — 97140 MANUAL THERAPY 1/> REGIONS: CPT

## 2017-02-07 PROCEDURE — 97116 GAIT TRAINING THERAPY: CPT

## 2017-02-07 NOTE — PROGRESS NOTES
Jeffry Lindquist  : 1955 [facility] [place]  [address] [facility], [address] [place] [address],[address], [place] [address].  [phone]   [phone]          OUTPATIENT PHYSICAL THERAPY:Daily Note 2017    ICD-10: Treatment Diagnosis: Nondisplaced fracture of right tibial tuberosity, sequela (S82.154S)  Precautions/Allergies: Pcn [penicillins]   Fall Risk Score: 4  MD Orders: Continue PT NWB R LE x 1 wk, then advance to 10-20 kilos x 2 wks. If no pain, can then advance to 50% WB in brace. (written 17) MEDICAL/REFERRING DIAGNOSIS:Status post fracture R tibial plateau  DATE OF ONSET: 16  REFERRING PHYSICIAN: Matilde Sahu MD  RETURN PHYSICIAN APPOINTMENT: 17     INITIAL ASSESSMENT:  Mr. Saumya Shaw presents in unlocked knee brace on crutches NWB on R after a fall 16. He sustained a non-displaced tibia fracture at that time. He will be NWB for approximately 12 weeks. He will benefit from therapy to return to function due to problems listed below. Mr Saumya Shaw has an insurance allowance of 20 visits per year so it will be challenging for him to rehabilitate from this problem within those visits. It will require that he perform much of his exercise and activities on his own. The initial phase- until his weight bearing status changes is focused on preventing motion loss in the joints of his LE, maintaining the muscle length and slowing the muscle atrophy associated with NWB. Reaching these goals initially will expedite the long term goals. PROBLEM LIST (Impacting functional limitations):  1. Decreased Strength  2. Decreased Transfer Abilities  3. Decreased Ambulation Ability/Technique  4. Increased Pain  5. Decreased Flexibility/Joint Mobility INTERVENTIONS PLANNED:  1. Balance Exercise  2. Gait Training  3. Home Exercise Program (HEP)  4.  Therapeutic Exercise/Strengthening   TREATMENT PLAN:  Effective Dates: 17 TO 17  Frequency/Duration: 2 times a week for 2 weeks  GOALS: (Goals have been discussed and agreed upon with patient.)  Short-Term Functional Goals: Time Frame:  4 weeks  1. ROM Knee 0-120  2. Independent with HEP to address deficits. Discharge Goals: Time Frame: 4-6 months  1. Return to FWB ambulation without assistive device. 2. Strength in LE WNL to support gait and other activities. 3. ROM WNL to allow for return to all function. 4. Improved LEFS by 10 points or greater indicating improved function. Rehabilitation Potential For Stated Goals: Yon Schulz, PT                 HISTORY:   History of Present Injury/Illness (Reason for Referral):  Mr Bela Haider sustained a non displaced tibial plateau fracture with a fall from a ladder 12/11/16. He has been NWB on crutches since that time. He does well with his wt bearing status and states he has not had much difficulty controlling his pain. Past Medical History/Comorbidities: cancer in the past  Social History/Living Environment:    Lives with his wife in a ranch style house with no stairs that he has to use. It does have significant acreage that he is anxious to get back to being active in. Prior Level of Function/Work/Activity:  Independent in all activities, active in his home and yard, exercises at the [facility] several times per week. Current Medications:  Claritin    Date Last Reviewed:  2/7/17   EXAMINATION:   Observation/Orthostatic Postural Assessment:  No new        ROM:          3-120 degrees- 1/24/17        AROM R knee 0-123 on 1/26/17  Strength:          Not tested due to recent fracture. Functional Mobility:         Gait/Ambulation:  Ambulating into clinic in brace NWB with crutches. Balance:        Independent with NWB ambulation   Body Structures Involved:  1. Bones  2. Joints  3. Muscles  4. Ligaments Body Functions Affected:  1. Neuromusculoskeletal  2. Movement Related Activities and Participation Affected:  1. Mobility   CLINICAL DECISION MAKING:   Outcome Measure:    Tool Used: Lower Extremity Functional Scale (LEFS)  Score:  Initial: 16/80 Most Recent: X/80 (Date: -- )   Interpretation of Score: 20 questions each scored on a 5 point scale with 0 representing \"extreme difficulty or unable to perform\" and 4 representing \"no difficulty\". The lower the score, the greater the functional disability. 80/80 represents no disability. Minimal detectable change is 9 points. Score 80 79-63 62-48 47-32 31-16 15-1 0   Modifier CH CI CJ CK CL CM CN     Medical Necessity:   · Patient is expected to demonstrate progress in strength, range of motion, balance, coordination and functional technique to return to independent gait and activities. .  Reason for Services/Other Comments:  · Patient continues to require skilled intervention due to need to progress to full function . TREATMENT:   (In addition to Assessment/Re-Assessment sessions the following treatments were rendered)    Subjective:  Pt reports no new problems. He is anxious to start weight bearing today,     Therapeutic Exercise: ( ):  none today       Date:   Date:  1/10 Date:  1/12 Date  1/17 Date  1/24   Activity/Exercise Parameters Parameters Parameters     Dorsiflex stretch with towel 3 x 20   3 x 30 sec Using stretch strap   Heel slide in brace 10 25 25     quads 15 x 5 sec  25 25x 20x   Ankle pumps 15 30   With overstretch 15x   ciricles 10       Ankle 4 way with theraband  Blue band 30x Blue band 30x Blue band 40x Blue band 40x   SLR 4 ways  25x 25x 2# with ext 2.5 # 40x 2.5 # 30x   Prone HS curl  25x 25# 2# 2.5# 40x 2.5 # 40x   Seated LAQ  25x 25 2.5# 40x 4# 30X   Seated march   15 2.5# 25x    Hip add in hooklying   20     Quadruped oppos extrem ext for R LE    15x 30x   HS stretch with strap    5 x 30 sec 5 x 30 with stretch strap   . Manual Therapy (10 minutes) - for increased ROM in R knee - grade 2 to 4- physio mobs R knee flex and extn with emphasis on extn. Grade 2-3 patellar glides in all directions.        Gait Training (15 minutes). Started with patient practicing putting weight on R LE with foot on scale and working to not exceed 44 pounds (20 kilos). Then practiced the same with patient not looking at scale but PT monitoring. Then worked on gait with 10-20 kilos WBing on R LE, and practiced again with scale to see how much weight patient had been putting on leg. Also practice curb type step after instruction on technique. Treatment/Session Assessment:  Pt did well with start of WBing and was good at not exceeding 20 kilos. We will cancel his Thursday appointment as he wants to conserve visits, and he is to return next week. · Pre-treatment Symptoms:  No current pain reported except occasional twinges. · Pain: Initial:  Pain Intensity 1: 1  Post Session: No increase in pain reported. ·   Compliance with Program/Exercises: yes  · Recommendations/Intent for next treatment session: \"Next visit will focus on review of WBing at 10-20 kilos. \".   Total Treatment Duration:  25 min  PT Patient Time In/Time Out  Time In: 0815  Time Out: 0845    Luana Falcon, PT

## 2017-02-14 ENCOUNTER — HOSPITAL ENCOUNTER (OUTPATIENT)
Dept: PHYSICAL THERAPY | Age: 62
Discharge: HOME | End: 2017-02-14
Payer: COMMERCIAL

## 2017-02-14 PROCEDURE — 97140 MANUAL THERAPY 1/> REGIONS: CPT

## 2017-02-14 PROCEDURE — 97110 THERAPEUTIC EXERCISES: CPT

## 2017-02-14 NOTE — PROGRESS NOTES
Corina Miller  : 1955 [facility] [place]  [address] [facility], [address] [place] [address],[address], [address] [facility]  [phone]   [phone]          OUTPATIENT PHYSICAL THERAPY:Daily Note 2017    ICD-10: Treatment Diagnosis: Nondisplaced fracture of right tibial tuberosity, sequela (S82.154S)  Precautions/Allergies: Pcn [penicillins]   Fall Risk Score: 4  MD Orders: Continue PT NWB R LE x 1 wk, then advance to 10-20 kilos x 2 wks. If no pain, can then advance to 50% WB in brace. (written 17) MEDICAL/REFERRING DIAGNOSIS:Status post fracture R tibial plateau  DATE OF ONSET: 16  REFERRING PHYSICIAN: Angelika Becerra MD  RETURN PHYSICIAN APPOINTMENT: 17     INITIAL ASSESSMENT:  Mr. Camelia Young presents in unlocked knee brace on crutches NWB on R after a fall 16. He sustained a non-displaced tibia fracture at that time. He will be NWB for approximately 12 weeks. He will benefit from therapy to return to function due to problems listed below. Mr Camelia Young has an insurance allowance of 20 visits per year so it will be challenging for him to rehabilitate from this problem within those visits. It will require that he perform much of his exercise and activities on his own. The initial phase- until his weight bearing status changes is focused on preventing motion loss in the joints of his LE, maintaining the muscle length and slowing the muscle atrophy associated with NWB. Reaching these goals initially will expedite the long term goals. PROBLEM LIST (Impacting functional limitations):  1. Decreased Strength  2. Decreased Transfer Abilities  3. Decreased Ambulation Ability/Technique  4. Increased Pain  5. Decreased Flexibility/Joint Mobility INTERVENTIONS PLANNED:  1. Balance Exercise  2. Gait Training  3. Home Exercise Program (HEP)  4.  Therapeutic Exercise/Strengthening   TREATMENT PLAN:  Effective Dates: 17 TO 17  Frequency/Duration: 2 times a week for 2 weeks  GOALS: (Goals have been discussed and agreed upon with patient.)  Short-Term Functional Goals: Time Frame:  4 weeks  1. ROM Knee 0-120  2. Independent with HEP to address deficits. Discharge Goals: Time Frame: 4-6 months  1. Return to FWB ambulation without assistive device. 2. Strength in LE WNL to support gait and other activities. 3. ROM WNL to allow for return to all function. 4. Improved LEFS by 10 points or greater indicating improved function. Rehabilitation Potential For Stated Goals: Yon Shepherd PT                 HISTORY:   History of Present Injury/Illness (Reason for Referral):  Mr Shabnam aCrlisle sustained a non displaced tibial plateau fracture with a fall from a ladder 12/11/16. He has been NWB on crutches since that time. He does well with his wt bearing status and states he has not had much difficulty controlling his pain. Past Medical History/Comorbidities: cancer in the past  Social History/Living Environment:    Lives with his wife in a ranch style house with no stairs that he has to use. It does have significant acreage that he is anxious to get back to being active in. Prior Level of Function/Work/Activity:  Independent in all activities, active in his home and yard, exercises at the [facility] several times per week. Current Medications:  Claritin    Date Last Reviewed:  2/7/17   EXAMINATION:   Observation/Orthostatic Postural Assessment:  No new        ROM:          3-120 degrees- 1/24/17        AROM R knee 0-123 on 1/26/17  Strength:          Not tested due to recent fracture. Functional Mobility:         Gait/Ambulation:  Ambulating into clinic in brace NWB with crutches. Balance:        Independent with NWB ambulation   Body Structures Involved:  1. Bones  2. Joints  3. Muscles  4. Ligaments Body Functions Affected:  1. Neuromusculoskeletal  2. Movement Related Activities and Participation Affected:  1. Mobility   CLINICAL DECISION MAKING:   Outcome Measure:    Tool Used: Lower Extremity Functional Scale (LEFS)  Score:  Initial: 16/80 Most Recent: X/80 (Date: -- )   Interpretation of Score: 20 questions each scored on a 5 point scale with 0 representing \"extreme difficulty or unable to perform\" and 4 representing \"no difficulty\". The lower the score, the greater the functional disability. 80/80 represents no disability. Minimal detectable change is 9 points. Score 80 79-63 62-48 47-32 31-16 15-1 0   Modifier CH CI CJ CK CL CM CN     Medical Necessity:   · Patient is expected to demonstrate progress in strength, range of motion, balance, coordination and functional technique to return to independent gait and activities. .  Reason for Services/Other Comments:  · Patient continues to require skilled intervention due to need to progress to full function . TREATMENT:   (In addition to Assessment/Re-Assessment sessions the following treatments were rendered)    Subjective:  Pt reports no new problems. He is anxious to start weight bearing today,     Therapeutic Exercise: ( ):  none today       Date:   Date:  1/10 Date:  1/12 Date  1/17 Date  1/24 Date  2/14   Activity/Exercise Parameters Parameters Parameters      Dorsiflex stretch with towel 3 x 20   3 x 30 sec Using stretch strap    Heel slide in brace 10 25 25      quads 15 x 5 sec  25 25x 20x    Ankle pumps 15 30   With overstretch 15x    ciricles 10        Ankle 4 way with theraband  Blue band 30x Blue band 30x Blue band 40x Blue band 40x    SLR 4 ways  25x 25x 2# with ext 2.5 # 40x 2.5 # 30x 4# x 30   Prone HS curl  25x 25# 2# 2.5# 40x 2.5 # 40x 4# x 30   Seated LAQ  25x 25 2.5# 40x 4# 30X 4# 30x   Seated march   15 2.5# 25x     Hip add in hooklying   20      Quadruped oppos extrem ext for R LE    15x 30x    HS stretch with strap    5 x 30 sec 5 x 30 with stretch strap    . Manual Therapy (15  minutes) - for increased ROM in R knee - grade 2 to 4- physio mobs R knee flex and extn with emphasis on extn.  Grade 2-3 patellar glides in all directions. Therapeutic ex- 20 min  See above    · Treatment/Session Assessment:  Pt reported doing well with wt bearing status. He has a scale at home and one at work and has been checking to make sure he is keeping his status. ROM looks good. He is doing well. · Pre-treatment Symptoms:  No current pain reported except occasional twinges. · Pain: Initial:    0/10 Post Session: No increase in pain reported. ·   Compliance with Program/Exercises: yes  · Recommendations/Intent for next treatment session: \"Next visit will focus on review of WBing at 10-20 kilos. \".   Total Treatment Duration:  35 min       Keith Park, PT

## 2017-02-16 ENCOUNTER — APPOINTMENT (OUTPATIENT)
Dept: PHYSICAL THERAPY | Age: 62
End: 2017-02-16
Payer: COMMERCIAL

## 2017-02-21 ENCOUNTER — HOSPITAL ENCOUNTER (OUTPATIENT)
Dept: PHYSICAL THERAPY | Age: 62
Discharge: HOME | End: 2017-02-21
Payer: COMMERCIAL

## 2017-02-21 PROCEDURE — 97140 MANUAL THERAPY 1/> REGIONS: CPT

## 2017-02-21 PROCEDURE — 97116 GAIT TRAINING THERAPY: CPT

## 2017-02-21 NOTE — PROGRESS NOTES
Lisbeth Ronaldo  : 1955 [facility] at [facility] DEONTE GARCIA  [address] [facility], [address] [place] [address],[address], [place] [address].  [phone]   [phone]          OUTPATIENT PHYSICAL THERAPY:Daily Note and Progress Report 2017    ICD-10: Treatment Diagnosis: Nondisplaced fracture of right tibial tuberosity, sequela (S82.154S)  Precautions/Allergies: Pcn [penicillins]   Fall Risk Score: 4  MD Orders: Continue PT NWB R LE x 1 wk, then advance to 10-20 kilos x 2 wks. If no pain, can then advance to 50% WB in brace. (written 17) MEDICAL/REFERRING DIAGNOSIS:Status post fracture R tibial plateau  DATE OF ONSET: 16  REFERRING PHYSICIAN: Roxanna Roberto MD  RETURN PHYSICIAN APPOINTMENT: 17     INITIAL ASSESSMENT:  Mr. Xander Daniels is 10 weeks status post undisplaced tibial fracture. He has progressed well with his rehabilitation and progressed to 50% wt bearing today without difficulty. He is PWB with crutches with brace unlocked. He will benefit from PT for guided rehabilitation to progress wt bearing and return to previous level of function. PROBLEM LIST (Impacting functional limitations):  1. Decreased Strength  2. Decreased Transfer Abilities  3. Decreased Ambulation Ability/Technique  4. Increased Pain  5. Decreased Flexibility/Joint Mobility INTERVENTIONS PLANNED:  1. Balance Exercise  2. Gait Training  3. Home Exercise Program (HEP)  4. Therapeutic Exercise/Strengthening   TREATMENT PLAN:  Effective Dates: 17 TO 17  Frequency/Duration: 2 times a week for 2 weeks  GOALS: (Goals have been discussed and agreed upon with patient.)  Short-Term Functional Goals: Time Frame:  4 weeks  1. ROM Knee 0-120  MET   2. Independent with HEP to address deficits. MET  Discharge Goals: Time Frame: 4-6 months  1. Return to FWB ambulation without assistive device. ongoing  2. Strength in LE WNL to support gait and other activities. ongoing  3. ROM WNL to allow for return to all function.   Ongoing due to brace   4. Improved LEFS by 10 points or greater indicating improved function. ongoing  Rehabilitation Potential For Stated Goals: Yon Garg, ADDIE                 HISTORY:   History of Present Injury/Illness (Reason for Referral):  Mr Roseann Marquez sustained a non displaced tibial plateau fracture with a fall from a ladder 12/11/16. He has been NWB on crutches since that time. He does well with his wt bearing status and states he has not had much difficulty controlling his pain. Past Medical History/Comorbidities: cancer in the past  Social History/Living Environment:    Lives with his wife in a ranch style house with no stairs that he has to use. It does have significant acreage that he is anxious to get back to being active in. Prior Level of Function/Work/Activity:  Independent in all activities, active in his home and yard, exercises at the [facility] several times per week. Current Medications:  Claritin    Date Last Reviewed:  2/7/17   EXAMINATION:   Observation/Orthostatic Postural Assessment:  No new        ROM:          3-120 degrees- 1/24/17        AROM R knee 0-123 on 1/26/17  Strength:          Not tested due to recent fracture. Functional Mobility:         Gait/Ambulation:  Ambulating into clinic in brace NWB with crutches. Balance:        Independent with NWB ambulation   Body Structures Involved:  1. Bones  2. Joints  3. Muscles  4. Ligaments Body Functions Affected:  1. Neuromusculoskeletal  2. Movement Related Activities and Participation Affected:  1. Mobility   CLINICAL DECISION MAKING:   Outcome Measure: Tool Used: Lower Extremity Functional Scale (LEFS)  Score:  Initial: 16/80 Most Recent: X/80 (Date: -- )   Interpretation of Score: 20 questions each scored on a 5 point scale with 0 representing \"extreme difficulty or unable to perform\" and 4 representing \"no difficulty\". The lower the score, the greater the functional disability. 80/80 represents no disability.   Minimal detectable change is 9 points. Score 80 79-63 62-48 47-32 31-16 15-1 0   Modifier CH CI CJ CK CL CM CN     Medical Necessity:   · Patient is expected to demonstrate progress in strength, range of motion, balance, coordination and functional technique to return to independent gait and activities. .  Reason for Services/Other Comments:  · Patient continues to require skilled intervention due to need to progress to full function . TREATMENT:   (In addition to Assessment/Re-Assessment sessions the following treatments were rendered)    Subjective:  Pt reports no new problems. He is ready to progress his wt bearing today. Therapeutic Exercise: ( ):  none today       Date:   Date:  1/10 Date:  1/12 Date  1/17 Date  1/24 Date  2/14 Date  2/21   Activity/Exercise Parameters Parameters Parameters       Dorsiflex stretch with towel 3 x 20   3 x 30 sec Using stretch strap     Heel slide in brace 10 25 25       quads 15 x 5 sec  25 25x 20x     Ankle pumps 15 30   With overstretch 15x     ciricles 10         Ankle 4 way with theraband  Blue band 30x Blue band 30x Blue band 40x Blue band 40x     SLR 4 ways  25x 25x 2# with ext 2.5 # 40x 2.5 # 30x 4# x 30    Prone HS curl  25x 25# 2# 2.5# 40x 2.5 # 40x 4# x 30    Seated LAQ  25x 25 2.5# 40x 4# 30X 4# 30x    Seated march   15 2.5# 25x      Hip add in hooklying   20       Quadruped oppos extrem ext for R LE    15x 30x     HS stretch with strap    5 x 30 sec 5 x 30 with stretch strap  Manual stretching x 5   . Manual Therapy (15  minutes) - for increased ROM in R knee - grade 2 to 4- physio mobs R knee flex and extn with emphasis on extn. Grade 2-3 patellar glides in all directions. Gait Training- 15 minutes-  Pt progressed to 50% wt bearing which is approximately 100#. Used scale in clinic and pt was able to control wt bearing to 100# without difficulty. He stated it felt very stable and natural to wt bear at that level.   He was able to walk with the crutches PWB without difficulty and then return to scale and was bearing 100#. He continues to use a scale at his home and his office to check on his wt bearing status. · Treatment/Session Assessment: Pt did well with increasing wt bearing status today. He has minimal HS tightness and we worked on [employer] for that as well as manual stretching. He returns to Dr Kierra Melo on Monday. He is doing well. · Pre-treatment Symptoms:  No current pain reported except occasional twinges. · Pain: Initial:  Pain Intensity 1: 0 (0 after session) 0/10 Post Session: No increase in pain reported. ·   Compliance with Program/Exercises: yes  · Recommendations/Intent for next treatment session: \"Next visit will focus on progress as ordered. \".   Total Treatment Duration:  30 min  PT Patient Time In/Time Out  Time In: 0800  Time Out: 0830    Yoselyn Romero PT

## 2017-02-21 NOTE — PROGRESS NOTES
Thuy Sal  : 1955 [facility] at [facility] DEONTE GARCIA  1101 [facility], [address] [place] [address],[address], [address] [facility]  [phone]   [phone]      Outpatient PHYSICAL THERAPY: PHYSICIAN COMMUNICATION    REFERRING PHYSICIAN: Juan Tsang MD  Return Physician Appointment: 17  MEDICAL/REFERRING DIAGNOSIS:  · right tibia fx  ATTENDANCE: Thuy Sal has attended 10 out of 10 visits, with 0 cancellation(s) and 0 no shows. ASSESSMENT:  DATE: 2017    PROGRESS: Thuy Sal continues to do well. He progressed to 50% wt bearing without difficulty on 17. He continues to be independent with HEP for ROM and strength as he has limited PT visits due to insurance. He has 20 allowed visits so we are half way through his allowance. He is very compliant and is doing well with home exercises. RECOMMENDATIONS: We will progress him per order.     Thank you for this referral,  Adina Blood, PT

## 2017-03-01 ENCOUNTER — HOSPITAL ENCOUNTER (OUTPATIENT)
Dept: PHYSICAL THERAPY | Age: 62
Discharge: HOME | End: 2017-03-01
Payer: COMMERCIAL

## 2017-03-01 PROCEDURE — 97110 THERAPEUTIC EXERCISES: CPT

## 2017-03-01 NOTE — PROGRESS NOTES
Thuy Class  : 1955 [facility] at [facility] DEONTE GARCIA  1101 [facility], [address] [place] [address],[address], [place] [address].  [phone]   [phone]          OUTPATIENT PHYSICAL THERAPY:Daily Note 3/1/2017    ICD-10: Treatment Diagnosis: Nondisplaced fracture of right tibial tuberosity, sequela (S82.154S)  Precautions/Allergies: Pcn [penicillins]   Fall Risk Score: 4  MD Orders: Continue PT , WBAT, aggressive strengthening 2-3x/wk for 4 wks (written 17) MEDICAL/REFERRING Cassius Standing post fracture R tibial plateau  DATE OF ONSET: 16  REFERRING PHYSICIAN: Juan Tsang MD  RETURN PHYSICIAN APPOINTMENT: 3/28/17     INITIAL ASSESSMENT:  Mr. Bela Haider is 10 weeks status post undisplaced tibial fracture. He has progressed well with his rehabilitation and progressed to 50% wt bearing today without difficulty. He is PWB with crutches with brace unlocked. He will benefit from PT for guided rehabilitation to progress wt bearing and return to previous level of function. PROBLEM LIST (Impacting functional limitations):  1. Decreased Strength  2. Decreased Transfer Abilities  3. Decreased Ambulation Ability/Technique  4. Increased Pain  5. Decreased Flexibility/Joint Mobility INTERVENTIONS PLANNED:  1. Balance Exercise  2. Gait Training  3. Home Exercise Program (HEP)  4. Therapeutic Exercise/Strengthening   TREATMENT PLAN:  Effective Dates: 17 TO 17  Frequency/Duration: 2 times a week for 2 weeks  GOALS: (Goals have been discussed and agreed upon with patient.)  Short-Term Functional Goals: Time Frame:  4 weeks  1. ROM Knee 0-120  MET   2. Independent with HEP to address deficits. MET  Discharge Goals: Time Frame: 4-6 months  1. Return to FWB ambulation without assistive device. ongoing  2. Strength in LE WNL to support gait and other activities. ongoing  3. ROM WNL to allow for return to all function. Ongoing due to brace   4.  Improved LEFS by 10 points or greater indicating improved function. ongoing  Rehabilitation Potential For Stated Goals: Yon Montes De Oca PT                 HISTORY:   History of Present Injury/Illness (Reason for Referral):  Mr Gabriel Tapia sustained a non displaced tibial plateau fracture with a fall from a ladder 12/11/16. He has been NWB on crutches since that time. He does well with his wt bearing status and states he has not had much difficulty controlling his pain. Past Medical History/Comorbidities: cancer in the past  Social History/Living Environment:    Lives with his wife in a ranch style house with no stairs that he has to use. It does have significant acreage that he is anxious to get back to being active in. Prior Level of Function/Work/Activity:  Independent in all activities, active in his home and yard, exercises at the [facility] several times per week. Current Medications:  Claritin    Date Last Reviewed:  2/7/17   EXAMINATION:   Observation/Orthostatic Postural Assessment:  No new        ROM:          3-120 degrees- 1/24/17        AROM R knee 0-123 on 1/26/17  Strength:          Not tested due to recent fracture. Functional Mobility:         Gait/Ambulation:  Ambulating into clinic in brace NWB with crutches. Balance:        Independent with NWB ambulation   Body Structures Involved:  1. Bones  2. Joints  3. Muscles  4. Ligaments Body Functions Affected:  1. Neuromusculoskeletal  2. Movement Related Activities and Participation Affected:  1. Mobility   CLINICAL DECISION MAKING:   Outcome Measure: Tool Used: Lower Extremity Functional Scale (LEFS)  Score:  Initial: 16/80 Most Recent: X/80 (Date: -- )   Interpretation of Score: 20 questions each scored on a 5 point scale with 0 representing \"extreme difficulty or unable to perform\" and 4 representing \"no difficulty\". The lower the score, the greater the functional disability. 80/80 represents no disability. Minimal detectable change is 9 points.   Score 80 79-63 62-48 47-32 31-16 15-1 0   Modifier CH CI CJ CK CL CM CN     Medical Necessity:   · Patient is expected to demonstrate progress in strength, range of motion, balance, coordination and functional technique to return to independent gait and activities. .  Reason for Services/Other Comments:  · Patient continues to require skilled intervention due to need to progress to full function . TREATMENT:   (In addition to Assessment/Re-Assessment sessions the following treatments were rendered)    Subjective:  Pt reports no new problems. Therapeutic Exercise: (40 Minutes):  Exercises per grid below to improve mobility and strength. Required moderate visual and verbal cues to ensure correct performance. Progressed complexity of movement as indicated. Date  1/17 Date  1/24 Date  2/14 Date  2/21 Date  3/1/17   Activity/Exercise        Dorsiflex stretch with towel 3 x 30 sec Using stretch strap   -   Heel slide in brace     -   quads 25x 20x   -   Ankle pumps  With overstretch 15x   -   ciricles     -   Ankle 4 way with theraband Blue band 40x Blue band 40x   -   SLR 4 ways 2.5 # 40x 2.5 # 30x 4# x 30  -   Prone HS curl 2.5# 40x 2.5 # 40x 4# x 30  -   Seated LAQ 2.5# 40x 4# 30X 4# 30x  -   Seated march 2.5# 25x    -   Hip add in hooklying     -   Quadruped oppos extrem ext for R LE 15x 30x   -   HS stretch with strap 5 x 30 sec 5 x 30 with stretch strap  Manual stretching x 5 -   Standing heel raises     2x10   Standing hip abduction     B 2x10   Partial knee bends     2x10   Shuttle B leg press     75# 3x10   Shuttle unilateral leg press     37# B 3x10   Forward step ups     2\" B 2x10   Forward tap downs     2\" B 2x10   Side stepping R & L     20' x 2 ea   SLS practice     B 3 x 10 sec   Manual therapy - None today  Gait Training - None today        · Treatment/Session Assessment: Pt did well with exercises yesterday. No pain reported at end of session. · Pre-treatment Symptoms:  No current pain reported except occasional twinges. · Pain: Initial:  Pain Intensity 1: 0 0/10 Post Session: No increase in pain reported after exercises. ·   Compliance with Program/Exercises: yes  · Recommendations/Intent for next treatment session: \"Next visit will focus on progress as ordered. \".   Total Treatment Duration:  40 min  PT Patient Time In/Time Out  Time In: 0818  Time Out: 0859    Tessie Mckinley PT

## 2017-03-03 ENCOUNTER — HOSPITAL ENCOUNTER (OUTPATIENT)
Dept: PHYSICAL THERAPY | Age: 62
Discharge: HOME | End: 2017-03-03
Payer: COMMERCIAL

## 2017-03-03 PROCEDURE — 97110 THERAPEUTIC EXERCISES: CPT

## 2017-03-03 NOTE — PROGRESS NOTES
Alex Ramos  : 1955 [facility] at [facility] DEONTE GARCIA  1101 [facility], [address] [place] [address],[address], [address] [facility]  [phone]   [phone]          OUTPATIENT PHYSICAL THERAPY:Daily Note 3/3/2017    ICD-10: Treatment Diagnosis: Nondisplaced fracture of right tibial tuberosity, sequela (S82.154S)  Precautions/Allergies: Pcn [penicillins]   Fall Risk Score: 4  MD Orders: Continue PT , WBAT, aggressive strengthening 2-3x/wk for 4 wks (written 17) MEDICAL/REFERRING DIAGNOSIS:Status post fracture R tibial plateau  DATE OF ONSET: 16  REFERRING PHYSICIAN: Jeanie Garcia MD  RETURN PHYSICIAN APPOINTMENT: 3/28/17     INITIAL ASSESSMENT:  Mr. Demetria Falcon is 10 weeks status post undisplaced tibial fracture. He has progressed well with his rehabilitation and progressed to 50% wt bearing today without difficulty. He is PWB with crutches with brace unlocked. He will benefit from PT for guided rehabilitation to progress wt bearing and return to previous level of function. PROBLEM LIST (Impacting functional limitations):  1. Decreased Strength  2. Decreased Transfer Abilities  3. Decreased Ambulation Ability/Technique  4. Increased Pain  5. Decreased Flexibility/Joint Mobility INTERVENTIONS PLANNED:  1. Balance Exercise  2. Gait Training  3. Home Exercise Program (HEP)  4. Therapeutic Exercise/Strengthening   TREATMENT PLAN:  Effective Dates: 17 TO 17  Frequency/Duration: 2 times a week for 2 weeks  GOALS: (Goals have been discussed and agreed upon with patient.)  Short-Term Functional Goals: Time Frame:  4 weeks  1. ROM Knee 0-120  MET   2. Independent with HEP to address deficits. MET  Discharge Goals: Time Frame: 4-6 months  1. Return to FWB ambulation without assistive device. ongoing  2. Strength in LE WNL to support gait and other activities. ongoing  3. ROM WNL to allow for return to all function. Ongoing due to brace   4.  Improved LEFS by 10 points or greater indicating improved function. ongoing  Rehabilitation Potential For Stated Goals: Yon Christina, PT                 HISTORY:   History of Present Injury/Illness (Reason for Referral):  Mr Maurizio Worley sustained a non displaced tibial plateau fracture with a fall from a ladder 12/11/16. He has been NWB on crutches since that time. He does well with his wt bearing status and states he has not had much difficulty controlling his pain. Past Medical History/Comorbidities: cancer in the past  Social History/Living Environment:    Lives with his wife in a ranch style house with no stairs that he has to use. It does have significant acreage that he is anxious to get back to being active in. Prior Level of Function/Work/Activity:  Independent in all activities, active in his home and yard, exercises at the [facility] several times per week. Current Medications:  Claritin    Date Last Reviewed:  2/7/17   EXAMINATION:   Observation/Orthostatic Postural Assessment:  No new        ROM:          3-120 degrees- 1/24/17        AROM R knee 0-123 on 1/26/17  Strength:          Not tested due to recent fracture. Functional Mobility:         Gait/Ambulation:  Ambulating into clinic in brace with WBAT. Balance:       Single leg balance 20\"   Body Structures Involved:  1. Bones  2. Joints  3. Muscles  4. Ligaments Body Functions Affected:  1. Neuromusculoskeletal  2. Movement Related Activities and Participation Affected:  1. Mobility   CLINICAL DECISION MAKING:   Outcome Measure: Tool Used: Lower Extremity Functional Scale (LEFS)  Score:  Initial: 16/80 Most Recent: X/80 (Date: -- )   Interpretation of Score: 20 questions each scored on a 5 point scale with 0 representing \"extreme difficulty or unable to perform\" and 4 representing \"no difficulty\". The lower the score, the greater the functional disability. 80/80 represents no disability. Minimal detectable change is 9 points.   Score 80 79-63 62-48 47-32 31-16 15-1 0   Modifier CH CI CJ CK CL CM CN     Medical Necessity:   · Patient is expected to demonstrate progress in strength, range of motion, balance, coordination and functional technique to return to independent gait and activities. .  Reason for Services/Other Comments:  · Patient continues to require skilled intervention due to need to progress to full function . TREATMENT:   (In addition to Assessment/Re-Assessment sessions the following treatments were rendered)      Therapeutic Exercise: (40 Minutes):  Exercises per grid below to improve mobility and strength. Required moderate visual and verbal cues to ensure correct performance. Progressed complexity of movement as indicated.        Date  1/17 Date  1/24 Date  2/14 Date  2/21 Date  3/1/17 Date  3-3-17   Activity/Exercise      parameters   Dorsiflex stretch with towel 3 x 30 sec Using stretch strap   -    Heel slide in brace     -    quads 25x 20x   - TKE 10# 3x10   Ankle pumps  With overstretch 15x   -    ciricles     -    Ankle 4 way with theraband Blue band 40x Blue band 40x   -    SLR 4 ways 2.5 # 40x 2.5 # 30x 4# x 30  - Hip abd 4#3x10   Prone HS curl 2.5# 40x 2.5 # 40x 4# x 30  - Seated on machine 25# 10x, 30# 2x10x   Seated LAQ 2.5# 40x 4# 30X 4# 30x  - Machine 10# 3x8   Side lying clam shell - - - - - Red 3x10   Seated march 2.5# 25x    -    Hip add in hooklying     -    Quadruped oppos extrem ext for R LE 15x 30x   -    HS stretch with strap 5 x 30 sec 5 x 30 with stretch strap  Manual stretching x 5 -    Standing heel raises     2x10 3x10   Standing hip abduction     B 2x10    Partial knee bends     2x10 On wall 2x10   Shuttle B leg press     75# 3x10 75# 3x10   Shuttle unilateral leg press     37# B 3x10 37# 3x10   Forward step ups     2\" B 2x10 2\" 2x10B  4\" 10xB   Forward tap downs     2\" B 2x10    Side stepping R & L     20' x 2 ea    Single leg bridge - - - - - 2x10   SLS practice     B 3 x 10 sec B 3x20\"  On airex 20\"x3   Manual therapy - None today  Gait Training - None today        · Treatment/Session Assessment: He worked hard with exercises. He had difficulty with single leg balance on the airex pad. · Pre-treatment Symptoms:    Pt reports he was a little sore while doing the exercises on Wednesday but no soreness the next day. He reports no swelling after exercises. · Pain: Initial:    0/10 Post Session: No increase in pain reported after exercises. ·   Compliance with Program/Exercises: yes  · Recommendations/Intent for next treatment session: \"Next visit will focus on progress as ordered. \".   Total Treatment Duration:  40 min  PT Patient Time In/Time Out  Time In: 0845  Time Out: [address], PT

## 2017-03-06 ENCOUNTER — HOSPITAL ENCOUNTER (OUTPATIENT)
Dept: PHYSICAL THERAPY | Age: 62
Discharge: HOME | End: 2017-03-06
Payer: COMMERCIAL

## 2017-03-06 PROCEDURE — 97110 THERAPEUTIC EXERCISES: CPT

## 2017-03-06 NOTE — PROGRESS NOTES
Oley Landau  : 1955 [facility] at [facility] DEONTE GARCIA  1101 [facility], [address] [place] [address],[address], [facility] [address].  [phone]   [phone]          OUTPATIENT PHYSICAL THERAPY:Daily Note 3/6/2017    ICD-10: Treatment Diagnosis: Nondisplaced fracture of right tibial tuberosity, sequela (S82.154S)  Precautions/Allergies: Pcn [penicillins]   Fall Risk Score: 4  MD Orders: Continue PT , WBAT, aggressive strengthening 2-3x/wk for 4 wks (written 17) MEDICAL/REFERRING Kevin Velazquez post fracture R tibial plateau  DATE OF ONSET: 16  REFERRING PHYSICIAN: Loida Mondragon MD  RETURN PHYSICIAN APPOINTMENT: 3/28/17     INITIAL ASSESSMENT:  Mr. Shabnam Carlisle is 10 weeks status post undisplaced tibial fracture. He has progressed well with his rehabilitation and progressed to 50% wt bearing today without difficulty. He is PWB with crutches with brace unlocked. He will benefit from PT for guided rehabilitation to progress wt bearing and return to previous level of function. PROBLEM LIST (Impacting functional limitations):  1. Decreased Strength  2. Decreased Transfer Abilities  3. Decreased Ambulation Ability/Technique  4. Increased Pain  5. Decreased Flexibility/Joint Mobility INTERVENTIONS PLANNED:  1. Balance Exercise  2. Gait Training  3. Home Exercise Program (HEP)  4. Therapeutic Exercise/Strengthening   TREATMENT PLAN:  Effective Dates: 17 TO 17  Frequency/Duration: 2 times a week for 2 weeks  GOALS: (Goals have been discussed and agreed upon with patient.)  Short-Term Functional Goals: Time Frame:  4 weeks  1. ROM Knee 0-120  MET   2. Independent with HEP to address deficits. MET  Discharge Goals: Time Frame: 4-6 months  1. Return to FWB ambulation without assistive device. ongoing  2. Strength in LE WNL to support gait and other activities. ongoing  3. ROM WNL to allow for return to all function. Ongoing due to brace   4.  Improved LEFS by 10 points or greater indicating improved function. ongoing  Rehabilitation Potential For Stated Goals: Yon Negro PT                 HISTORY:   History of Present Injury/Illness (Reason for Referral):  Mr Jordan Chase sustained a non displaced tibial plateau fracture with a fall from a ladder 12/11/16. He has been NWB on crutches since that time. He does well with his wt bearing status and states he has not had much difficulty controlling his pain. Past Medical History/Comorbidities: cancer in the past  Social History/Living Environment:    Lives with his wife in a ranch style house with no stairs that he has to use. It does have significant acreage that he is anxious to get back to being active in. Prior Level of Function/Work/Activity:  Independent in all activities, active in his home and yard, exercises at the [facility] several times per week. Current Medications:  Claritin    Date Last Reviewed:  3/6/17   EXAMINATION:   Observation/Orthostatic Postural Assessment:  No new        ROM:          3-120 degrees- 1/24/17        AROM R knee 0-123 on 1/26/17  Strength:          Not tested due to recent fracture. Functional Mobility:         Gait/Ambulation:  Ambulating into clinic in brace with WBAT. Balance:       Single leg balance 20\"   Body Structures Involved:  1. Bones  2. Joints  3. Muscles  4. Ligaments Body Functions Affected:  1. Neuromusculoskeletal  2. Movement Related Activities and Participation Affected:  1. Mobility   CLINICAL DECISION MAKING:   Outcome Measure: Tool Used: Lower Extremity Functional Scale (LEFS)  Score:  Initial: 16/80 Most Recent: X/80 (Date: -- )   Interpretation of Score: 20 questions each scored on a 5 point scale with 0 representing \"extreme difficulty or unable to perform\" and 4 representing \"no difficulty\". The lower the score, the greater the functional disability. 80/80 represents no disability. Minimal detectable change is 9 points.   Score 80 79-63 62-48 47-32 31-16 15-1 0   Modifier CH CI CJ CK CL CM CN     Medical Necessity:   · Patient is expected to demonstrate progress in strength, range of motion, balance, coordination and functional technique to return to independent gait and activities. .  Reason for Services/Other Comments:  · Patient continues to require skilled intervention due to need to progress to full function . TREATMENT:   (In addition to Assessment/Re-Assessment sessions the following treatments were rendered)    Subjective:  Patient reports his back is sore from cutting up a pine tree that had fallen in his yard last week. He was not used to the physical work after not being able to do much for several months. Therapeutic Exercise: (40 Minutes):  Exercises per grid below to improve mobility and strength. Required moderate visual and verbal cues to ensure correct performance. Progressed resistance as indicated. Date  2/14 Date  2/21 Date  3/1/17 Date  3-3-17 Date  3/6/17   Activity/Exercise    parameters    quads   - TKE 10# 3x10 -   SLR 4 ways 4# x 30  - Hip abd 4#3x10 Abduct 5# 3x15   Prone HS curl 4# x 30  - Seated on machine 25# 10x, 30# 2x10x Machine  30# B 2x15     Seated LAQ 4# 30x  - Machine 10# 3x8 Machine  10# B 2x15   Side lying clam shell - - - Red 3x10 5# 3x15   HS stretch with strap  Manual stretching x 5 -  -   Standing heel raises   2x10 3x10 2x15   Standing hip abduction   B 2x10  B 2x15   Partial knee bends   2x10 On wall 2x10 Wall 2x10   Shuttle B leg press   75# 3x10 75# 3x10 100# 3x15   Shuttle unilateral leg press   37# B 3x10 37# 3x10 50# B 2x15   Forward step ups   2\" B 2x10 2\" 2x10B  4\" 10xB 4\" B 2x15   Forward tap downs   2\" B 2x10  2\" B 2x15   Side stepping R & L   20' x 2 ea  25' x 2 ea   Single leg bridge - - - 2x10 2x10   SLS practice   B 3 x 10 sec B 3x20\"  On airex 20\"x3 airex B x 3   Manual therapy - None today  Gait Training - None today        · Treatment/Session Assessment: He worked hard with exercises.  Balance appears to be improving · Pre-treatment Symptoms:    Pt reports no new problems with LE.   · Pain: Initial:  Pain Intensity 1: 0   Post Session: No pain at end of session. ·   Compliance with Program/Exercises: yes  · Recommendations/Intent for next treatment session: \"Next visit will focus on progress as ordered. \".   Total Treatment Duration:  40 min  PT Patient Time In/Time Out  Time In: 0730  Time Out: 3600 MIKE Zavala, ADDIE

## 2017-03-08 ENCOUNTER — HOSPITAL ENCOUNTER (OUTPATIENT)
Dept: PHYSICAL THERAPY | Age: 62
Discharge: HOME | End: 2017-03-08
Payer: COMMERCIAL

## 2017-03-08 PROCEDURE — 97110 THERAPEUTIC EXERCISES: CPT

## 2017-03-08 NOTE — PROGRESS NOTES
Cindy Bajwa  : 1955 [facility] at [facility] DEONTE GARCIA  1101 [facility], [address] [place] [address],[address], [place] [address].  [phone]   [phone]          OUTPATIENT PHYSICAL THERAPY:Daily Note 3/8/2017    ICD-10: Treatment Diagnosis: Nondisplaced fracture of right tibial tuberosity, sequela (S82.154S)  Precautions/Allergies: Pcn [penicillins]   Fall Risk Score: 4  MD Orders: Continue PT , WBAT, aggressive strengthening 2-3x/wk for 4 wks (written 17) MEDICAL/REFERRING DIAGNOSIS:Status post fracture R tibial plateau  DATE OF ONSET: 16  REFERRING PHYSICIAN: Jacinta Mccormack MD  RETURN PHYSICIAN APPOINTMENT: 3/28/17     INITIAL ASSESSMENT:  Mr. Mihir Ervin is 10 weeks status post undisplaced tibial fracture. He has progressed well with his rehabilitation and progressed to 50% wt bearing today without difficulty. He is PWB with crutches with brace unlocked. He will benefit from PT for guided rehabilitation to progress wt bearing and return to previous level of function. PROBLEM LIST (Impacting functional limitations):  1. Decreased Strength  2. Decreased Transfer Abilities  3. Decreased Ambulation Ability/Technique  4. Increased Pain  5. Decreased Flexibility/Joint Mobility INTERVENTIONS PLANNED:  1. Balance Exercise  2. Gait Training  3. Home Exercise Program (HEP)  4. Therapeutic Exercise/Strengthening   TREATMENT PLAN:  Effective Dates: 17 TO 17  Frequency/Duration: 2 times a week for 2 weeks  GOALS: (Goals have been discussed and agreed upon with patient.)  Short-Term Functional Goals: Time Frame:  4 weeks  1. ROM Knee 0-120  MET   2. Independent with HEP to address deficits. MET  Discharge Goals: Time Frame: 4-6 months  1. Return to FWB ambulation without assistive device. ongoing  2. Strength in LE WNL to support gait and other activities. ongoing  3. ROM WNL to allow for return to all function. Ongoing due to brace   4.  Improved LEFS by 10 points or greater indicating improved function. ongoing  Rehabilitation Potential For Stated Goals: 2 Ambreen Aguilar                 HISTORY:   History of Present Injury/Illness (Reason for Referral):  Mr Nisa Rock sustained a non displaced tibial plateau fracture with a fall from a ladder 12/11/16. He has been NWB on crutches since that time. He does well with his wt bearing status and states he has not had much difficulty controlling his pain. Past Medical History/Comorbidities: cancer in the past  Social History/Living Environment:    Lives with his wife in a ranch style house with no stairs that he has to use. It does have significant acreage that he is anxious to get back to being active in. Prior Level of Function/Work/Activity:  Independent in all activities, active in his home and yard, exercises at the [facility] several times per week. Current Medications:  Claritin    Date Last Reviewed:  3/6/17   EXAMINATION:   Observation/Orthostatic Postural Assessment:  No new        ROM:          3-120 degrees- 1/24/17        AROM R knee 0-123 on 1/26/17  Strength:          Not tested due to recent fracture. Functional Mobility:         Gait/Ambulation:  Ambulating into clinic in brace with WBAT. Balance:       Single leg balance 20\"   Body Structures Involved:  1. Bones  2. Joints  3. Muscles  4. Ligaments Body Functions Affected:  1. Neuromusculoskeletal  2. Movement Related Activities and Participation Affected:  1. Mobility   CLINICAL DECISION MAKING:   Outcome Measure: Tool Used: Lower Extremity Functional Scale (LEFS)  Score:  Initial: 16/80 Most Recent: X/80 (Date: -- )   Interpretation of Score: 20 questions each scored on a 5 point scale with 0 representing \"extreme difficulty or unable to perform\" and 4 representing \"no difficulty\". The lower the score, the greater the functional disability. 80/80 represents no disability. Minimal detectable change is 9 points.   Score 80 79-63 62-48 47-32 31-16 15-1 0   Modifier CH CI CJ CK CL CM CN     Medical Necessity:   · Patient is expected to demonstrate progress in strength, range of motion, balance, coordination and functional technique to return to independent gait and activities. .  Reason for Services/Other Comments:  · Patient continues to require skilled intervention due to need to progress to full function . TREATMENT:   (In addition to Assessment/Re-Assessment sessions the following treatments were rendered)    Subjective:  Patient says that his knee seems to be feeling pretty good. Can tell he is getting stronger. Is doing exercises at home. Therapeutic Exercise: (38 Minutes):  Exercises per grid below to improve mobility and strength. Required moderate visual and verbal cues to ensure correct performance. Progressed resistance as indicated.      Date  2/14 Date  2/21 Date  3/1/17 Date  3-3-17 Date  3/6/17 Date   3-8-17   Activity/Exercise    parameters     quads   - TKE 10# 3x10 -    SLR 4 ways 4# x 30  - Hip abd 4#3x10 Abduct 5# 3x15 Cable 3# x10 ea   Prone HS curl 4# x 30  - Seated on machine 25# 10x, 30# 2x10x Machine  30# B 2x15   Machine 30# unilat 2x15   Seated LAQ 4# 30x  - Machine 10# 3x8 Machine  10# B 2x15 Machine 10# unilat 2x15   Side lying clam shell - - - Red 3x10 5# 3x15    HS stretch with strap  Manual stretching x 5 -  -    Standing heel raises   2x10 3x10 2x15 unilat 2x10 ea   Standing hip abduction   B 2x10  B 2x15    Partial knee bends   2x10 On wall 2x10 Wall 2x10 Wall with stability ball R LE back 2x10   Shuttle B leg press   75# 3x10 75# 3x10 100# 3x15 100# 2x15   Shuttle unilateral leg press   37# B 3x10 37# 3x10 50# B 2x15 50# 2x15   Forward step ups   2\" B 2x10 2\" 2x10B  4\" 10xB 4\" B 2x15 6\" 2x15   Forward tap downs   2\" B 2x10  2\" B 2x15 4\" 2x15   Side stepping R & L   20' x 2 ea  25' x 2 ea Red 4x25'   Single leg bridge - - - 2x10 2x10    SLS practice   B 3 x 10 sec B 3x20\"  On airex 20\"x3 airex B x 3 X30\"  airex x30\"   Lunge slide      Back 2x10 Manual therapy - None today  Gait Training - None today        · Treatment/Session Assessment: Pt is doing well with exercises. Was able to progress strengthening today without difficulty. · Pre-treatment Symptoms:    Pt reports no new problems with LE.   · Pain: Initial:      0/10 Post Session: No pain at end of session. ·   Compliance with Program/Exercises: yes  · Recommendations/Intent for next treatment session: \"Next visit will focus on progress as ordered. \". We will drop to 1x/week with pt working independently in order to conserve PT visits.    Total Treatment Duration:  38 min  PT Patient Time In/Time Out  Time In: 0815  Time Out: 310 [facility]

## 2017-03-13 ENCOUNTER — HOSPITAL ENCOUNTER (OUTPATIENT)
Dept: PHYSICAL THERAPY | Age: 62
Discharge: HOME | End: 2017-03-13
Payer: COMMERCIAL

## 2017-03-13 PROCEDURE — 97110 THERAPEUTIC EXERCISES: CPT

## 2017-03-13 NOTE — PROGRESS NOTES
Lisbeth Ronaldo  : 1955 [facility] at [facility] DEONTE GARCIA  1101 [facility], [address] [place] [address],[address], [place] [address].  [phone]   [phone]          OUTPATIENT PHYSICAL THERAPY:Daily Note 3/13/2017    ICD-10: Treatment Diagnosis: Nondisplaced fracture of right tibial tuberosity, sequela (S82.154S)  Precautions/Allergies: Pcn [penicillins]   Fall Risk Score: 4  MD Orders: Continue PT , WBAT, aggressive strengthening 2-3x/wk for 4 wks (written 17) MEDICAL/REFERRING DIAGNOSIS:Status post fracture R tibial plateau  DATE OF ONSET: 16  REFERRING PHYSICIAN: Roxanna Roberto MD  RETURN PHYSICIAN APPOINTMENT: 3/28/17     Most Recent ASSESSMENT:  Mr. Xander Daniels is 10 weeks status post undisplaced tibial fracture. He has progressed well with his rehabilitation and progressed to 50% wt bearing today without difficulty. He is PWB with crutches with brace unlocked. He will benefit from PT for guided rehabilitation to progress wt bearing and return to previous level of function. PROBLEM LIST (Impacting functional limitations):  1. Decreased Strength  2. Decreased Transfer Abilities  3. Decreased Ambulation Ability/Technique  4. Increased Pain  5. Decreased Flexibility/Joint Mobility INTERVENTIONS PLANNED:  1. Balance Exercise  2. Gait Training  3. Home Exercise Program (HEP)  4. Therapeutic Exercise/Strengthening   TREATMENT PLAN:  Effective Dates: 17 TO 17  Frequency/Duration: 2 times a week for 2 weeks  GOALS: (Goals have been discussed and agreed upon with patient.)  Short-Term Functional Goals: Time Frame:  4 weeks  1. ROM Knee 0-120  MET   2. Independent with HEP to address deficits. MET  Discharge Goals: Time Frame: 4-6 months  1. Return to FWB ambulation without assistive device. ongoing  2. Strength in LE WNL to support gait and other activities. ongoing  3. ROM WNL to allow for return to all function. Ongoing due to brace   4.  Improved LEFS by 10 points or greater indicating improved function. ongoing  Rehabilitation Potential For Stated Goals: Yon Schulz, PT                 HISTORY:   History of Present Injury/Illness (Reason for Referral):  Mr Bela Haider sustained a non displaced tibial plateau fracture with a fall from a ladder 12/11/16. He has been NWB on crutches since that time. He does well with his wt bearing status and states he has not had much difficulty controlling his pain. Past Medical History/Comorbidities: cancer in the past  Social History/Living Environment:    Lives with his wife in a ranch style house with no stairs that he has to use. It does have significant acreage that he is anxious to get back to being active in. Prior Level of Function/Work/Activity:  Independent in all activities, active in his home and yard, exercises at the [facility] several times per week. Current Medications:  Claritin    Date Last Reviewed:  3/13/17   EXAMINATION:   Observation/Orthostatic Postural Assessment:  No new        ROM:          3-120 degrees- 1/24/17        AROM R knee 0-123 on 1/26/17  Strength:          Not tested due to recent fracture. Functional Mobility:         Gait/Ambulation:  Ambulating into clinic in brace with WBAT. Balance:       Single leg balance 20\"   Body Structures Involved:  1. Bones  2. Joints  3. Muscles  4. Ligaments Body Functions Affected:  1. Neuromusculoskeletal  2. Movement Related Activities and Participation Affected:  1. Mobility   CLINICAL DECISION MAKING:   Outcome Measure: Tool Used: Lower Extremity Functional Scale (LEFS)  Score:  Initial: 16/80 Most Recent: X/80 (Date: -- )   Interpretation of Score: 20 questions each scored on a 5 point scale with 0 representing \"extreme difficulty or unable to perform\" and 4 representing \"no difficulty\". The lower the score, the greater the functional disability. 80/80 represents no disability. Minimal detectable change is 9 points.   Score 80 79-63 62-48 47-32 31-16 15-1 0 Modifier CH CI CJ CK CL CM CN     Medical Necessity:   · Patient is expected to demonstrate progress in strength, range of motion, balance, coordination and functional technique to return to independent gait and activities. .  Reason for Services/Other Comments:  · Patient continues to require skilled intervention due to need to progress to full function . TREATMENT:   (In addition to Assessment/Re-Assessment sessions the following treatments were rendered)    Therapeutic Exercise: (40 Minutes):  Exercises per grid below to improve mobility and strength. Required moderate visual and verbal cues to ensure correct performance. Progressed resistance as indicated.     3x10 Date  2/14 Date  2/21 Date  3/1/17 Date  3-3-17 Date  3/6/17 Date   3-8-17 Date  3/13/17   Activity/Exercise    parameters      quads   - TKE 10# 3x10 -  -   SLR 4 ways 4# x 30  - Hip abd 4#3x10 Abduct 5# 3x15 Cable 3# x10 ea Abduct 5#  3x15   Prone HS curl 4# x 30  - Seated on machine 25# 10x, 30# 2x10x Machine  30# B 2x15   Machine 30# unilat 2x15 Machine 35#  unilat 215   Seated LAQ 4# 30x  - Machine 10# 3x8 Machine  10# B 2x15 Machine 10# unilat 2x15 Machine 15#  unilat  B 2x15   Side lying clam shell - - - Red 3x10 5# 3x15  -   HS stretch with strap  Manual stretching x 5 -  -  -   Standing heel raises   2x10 3x10 2x15 unilat 2x10 ea unilat 2x15   Standing hip abduction   B 2x10  B 2x15  On foam B 2x15   Partial knee bends   2x10 On wall 2x10 Wall 2x10 Wall with stability ball R LE back 2x10 Wall 2x10   Shuttle B leg press   75# 3x10 75# 3x10 100# 3x15 100# 2x15 125# 3x15   Shuttle unilateral leg press   37# B 3x10 37# 3x10 50# B 2x15 50# 2x15 62# B 3x15   Forward step ups   2\" B 2x10 2\" 2x10B  4\" 10xB 4\" B 2x15 6\" 2x15 8\"B 2x15   Forward tap downs   2\" B 2x10  2\" B 2x15 4\" 2x15 4\" 2x15   Side stepping R & L   20' x 2 ea  25' x 2 ea Red 4x25' -   Single leg bridge - - - 2x10 2x10  -   SLS practice   B 3 x 10 sec B 3x20\"  On airex 20\"x3 airex B x 3 X30\"  airex x30\" airex  B 15 sec x 3   Lunge slide      Back 2x10 Back 2x10   Manual therapy - None today  Gait Training - None today        · Treatment/Session Assessment: Pt is doing well with exercises. Was able to progress strengthening today without difficulty. · Pre-treatment Symptoms:    Pt reports no new problems with LE. He was very active in the yard over the weekend and did not have any problems. He agrees that he can decrease frequency to 1x/wk until he returns to MD.   · Pain: Initial:  Pain Intensity 1: 0   0/10 Post Session: No pain at end of session. ·   Compliance with Program/Exercises: yes  · Recommendations/Intent for next treatment session: \"Next visit will focus on strengthening\".      Total Treatment Duration:  40 min  PT Patient Time In/Time Out  Time In: 0730  Time Out: 0815    Erum Dominique PT

## 2017-03-15 ENCOUNTER — APPOINTMENT (OUTPATIENT)
Dept: PHYSICAL THERAPY | Age: 62
End: 2017-03-15
Payer: COMMERCIAL

## 2017-03-20 ENCOUNTER — HOSPITAL ENCOUNTER (OUTPATIENT)
Dept: PHYSICAL THERAPY | Age: 62
Discharge: HOME | End: 2017-03-20
Payer: COMMERCIAL

## 2017-03-20 PROCEDURE — 97110 THERAPEUTIC EXERCISES: CPT

## 2017-03-20 NOTE — PROGRESS NOTES
Shiloh Cardenas  : 1955 [facility] DEONTE GARCIA  [address] [facility], [address] [place] [address],[address], [address] [facility]  [phone]   [phone]          OUTPATIENT PHYSICAL THERAPY:Daily Note 3/20/2017    ICD-10: Treatment Diagnosis: Nondisplaced fracture of right tibial tuberosity, sequela (S82.154S)  Precautions/Allergies: Pcn [penicillins]   Fall Risk Score: 4  MD Orders: Continue PT , WBAT, aggressive strengthening 2-3x/wk for 4 wks (written 17) MEDICAL/REFERRING Penelope Bravo post fracture R tibial plateau  DATE OF ONSET: 16  REFERRING PHYSICIAN: Deshaun Downey MD  RETURN PHYSICIAN APPOINTMENT: 3/28/17     Most Recent ASSESSMENT:  Mr. Stoney Salas is 10 weeks status post undisplaced tibial fracture. He has progressed well with his rehabilitation and progressed to 50% wt bearing today without difficulty. He is PWB with crutches with brace unlocked. He will benefit from PT for guided rehabilitation to progress wt bearing and return to previous level of function. PROBLEM LIST (Impacting functional limitations):  1. Decreased Strength  2. Decreased Transfer Abilities  3. Decreased Ambulation Ability/Technique  4. Increased Pain  5. Decreased Flexibility/Joint Mobility INTERVENTIONS PLANNED:  1. Balance Exercise  2. Gait Training  3. Home Exercise Program (HEP)  4. Therapeutic Exercise/Strengthening   TREATMENT PLAN:  Effective Dates: 17 TO 17  Frequency/Duration: 2 times a week for 2 weeks  GOALS: (Goals have been discussed and agreed upon with patient.)  Short-Term Functional Goals: Time Frame:  4 weeks  1. ROM Knee 0-120  MET   2. Independent with HEP to address deficits. MET  Discharge Goals: Time Frame: 4-6 months  1. Return to FWB ambulation without assistive device. ongoing  2. Strength in LE WNL to support gait and other activities. ongoing  3. ROM WNL to allow for return to all function. Ongoing due to brace   4.  Improved LEFS by 10 points or greater indicating improved function. ongoing  Rehabilitation Potential For Stated Goals: 2 Ambreen Aguilar                 HISTORY:   History of Present Injury/Illness (Reason for Referral):  Mr Sherron Joaquin sustained a non displaced tibial plateau fracture with a fall from a ladder 12/11/16. He has been NWB on crutches since that time. He does well with his wt bearing status and states he has not had much difficulty controlling his pain. Past Medical History/Comorbidities: cancer in the past  Social History/Living Environment:    Lives with his wife in a ranch style house with no stairs that he has to use. It does have significant acreage that he is anxious to get back to being active in. Prior Level of Function/Work/Activity:  Independent in all activities, active in his home and yard, exercises at the [facility] several times per week. Current Medications:  Claritin    Date Last Reviewed:  3/20/17   EXAMINATION:   Observation/Orthostatic Postural Assessment:  No new        ROM:  Full R knee ROM  Strength:  Knee extension: 5/5, knee flexion: 5/5        Not tested due to recent fracture. Functional Mobility:         Gait/Ambulation:  Ambulating into clinic in brace with WBAT. Balance:       Single leg balance 30\"   Body Structures Involved:  1. Bones  2. Joints  3. Muscles  4. Ligaments Body Functions Affected:  1. Neuromusculoskeletal  2. Movement Related Activities and Participation Affected:  1. Mobility   CLINICAL DECISION MAKING:   Outcome Measure: Tool Used: Lower Extremity Functional Scale (LEFS)  Score:  Initial: 16/80 Most Recent: X/80 (Date: -- )   Interpretation of Score: 20 questions each scored on a 5 point scale with 0 representing \"extreme difficulty or unable to perform\" and 4 representing \"no difficulty\". The lower the score, the greater the functional disability. 80/80 represents no disability. Minimal detectable change is 9 points.   Score 80 79-63 62-48 47-32 31-16 15-1 0   Modifier CH CI CJ CK CL CM CN     Medical Necessity:   · Patient is expected to demonstrate progress in strength, range of motion, balance, coordination and functional technique to return to independent gait and activities. .  Reason for Services/Other Comments:  · Patient continues to require skilled intervention due to need to progress to full function . TREATMENT:   (In addition to Assessment/Re-Assessment sessions the following treatments were rendered)  Warm-up on stationary bike L3 x 3 mins. Therapeutic Exercise: ( 38 Minutes):  Exercises per grid below to improve mobility and strength. Required moderate visual and verbal cues to ensure correct performance. Progressed resistance as indicated.   3x10 Date  2/21 Date  3/1/17 Date  3-3-17 Date  3/6/17 Date   3-8-17 Date  3/13/17 Date  3-20-17   Activity/Exercise   parameters       quads  - TKE 10# 3x10 -  -    SLR 4 ways  - Hip abd 4#3x10 Abduct 5# 3x15 Cable 3# x10 ea Abduct 5#  3x15 Cable 3# 2x10 ea   Prone HS curl  - Seated on machine 25# 10x, 30# 2x10x Machine  30# B 2x15   Machine 30# unilat 2x15 Machine 35#  unilat 215 Machine 35# 2x15   Seated LAQ  - Machine 10# 3x8 Machine  10# B 2x15 Machine 10# unilat 2x15 Machine 15#  unilat  B 2x15 Machine 20# unilat 2x10   Side lying clam shell - - Red 3x10 5# 3x15  -    HS stretch with strap Manual stretching x 5 -  -  -    Standing heel raises  2x10 3x10 2x15 unilat 2x10 ea unilat 2x15    Standing hip abduction  B 2x10  B 2x15  On foam B 2x15    Partial knee bends  2x10 On wall 2x10 Wall 2x10 Wall with stability ball R LE back 2x10 Wall 2x10 Wall sit 5x10\"   Shuttle B leg press  75# 3x10 75# 3x10 100# 3x15 100# 2x15 125# 3x15 125# 2x15   Shuttle unilateral leg press  37# B 3x10 37# 3x10 50# B 2x15 50# 2x15 62# B 3x15 62# 2x15   Forward step ups  2\" B 2x10 2\" 2x10B  4\" 10xB 4\" B 2x15 6\" 2x15 8\"B 2x15 8\" 2x15   Forward tap downs  2\" B 2x10  2\" B 2x15 4\" 2x15 4\" 2x15 4\" 2x15   Side stepping R & L  20' x 2 ea  25' x 2 ea Red 4x25' - Red 1x23'   Single leg bridge - - 2x10 2x10  - 2x10 ea   SLS practice  B 3 x 10 sec B 3x20\"  On airex 20\"x3 airex B x 3 X30\"  airex x30\" airex  B 15 sec x 3 Foam 2x30\" ea   Lunge slide     Back 2x10 Back 2x10 3-way 2x5 ea   Manual therapy - None today  Gait Training - None today        · Treatment/Session Assessment: No pain with exercises. Mild weakness with functional strengthening but overall doing well. · Pre-treatment Symptoms:    Pt had some pain to outside of knee over the weekend. Pain likely from using it more. Has returned to personal gym program.   · Pain: Initial:      0/10 Post Session: No pain at end of session. ·   Compliance with Program/Exercises: yes  · Recommendations/Intent for next treatment session: \"Next visit will focus on strengthening\".      Total Treatment Duration:  38 min  PT Patient Time In/Time Out  Time In: 0730  Time Out: [address], DPT

## 2017-03-22 ENCOUNTER — APPOINTMENT (OUTPATIENT)
Dept: PHYSICAL THERAPY | Age: 62
End: 2017-03-22
Payer: COMMERCIAL

## 2017-05-17 NOTE — PROGRESS NOTES
Alise Rachel  : 1955 [facility]  [address] [facility], [address] [place] [address],[address], [address] [facility]  [phone]   [phone]          OUTPATIENT PHYSICAL THERAPY:Discontinuation Summary    ICD-10: Treatment Diagnosis: Nondisplaced fracture of right tibial tuberosity, sequela (S82.154S)  Precautions/Allergies: Pcn [penicillins]   Fall Risk Score: 4  MD Orders: Continue PT , WBAT, aggressive strengthening 2-3x/wk for 4 wks (written 17) MEDICAL/REFERRING DIAGNOSIS:Status post fracture R tibial plateau  DATE OF ONSET: 16  REFERRING PHYSICIAN: Elisa Reyez MD  RETURN PHYSICIAN APPOINTMENT: 3/28/17     Most Recent ASSESSMENT:  Mr. Sandy Chavarria received 16 visits of PT and did not return after 3/20/17. He will be discharged at this time. PROBLEM LIST (Impacting functional limitations):  1. Decreased Strength  2. Decreased Transfer Abilities  3. Decreased Ambulation Ability/Technique  4. Increased Pain  5. Decreased Flexibility/Joint Mobility INTERVENTIONS PLANNED:  1. Balance Exercise  2. Gait Training  3. Home Exercise Program (HEP)  4. Therapeutic Exercise/Strengthening   TREATMENT PLAN:  Effective Dates: 17 TO 17  Frequency/Duration: 2 times a week for 2 weeks  GOALS: (Goals have been discussed and agreed upon with patient.)  Short-Term Functional Goals: Time Frame:  4 weeks  1. ROM Knee 0-120  MET   2. Independent with HEP to address deficits. MET  Discharge Goals: Time Frame: 4-6 months  1. Return to FWB ambulation without assistive device. ongoing  2. Strength in LE WNL to support gait and other activities. ongoing  3. ROM WNL to allow for return to all function. Ongoing due to brace   4. Improved LEFS by 10 points or greater indicating improved function.  ongoing  Rehabilitation Potential For Stated Goals: [facility] PT                 HISTORY:   History of Present Injury/Illness (Reason for Referral):  Mr Sandy Chavarria sustained a non displaced tibial plateau fracture with a fall from a ladder 12/11/16. He has been NWB on crutches since that time. He does well with his wt bearing status and states he has not had much difficulty controlling his pain. Past Medical History/Comorbidities: cancer in the past  Social History/Living Environment:    Lives with his wife in a ranch style house with no stairs that he has to use. It does have significant acreage that he is anxious to get back to being active in. Prior Level of Function/Work/Activity:  Independent in all activities, active in his home and yard, exercises at the [facility] several times per week. Current Medications:  Claritin    Date Last Reviewed:  3/20/17   EXAMINATION:   Observation/Orthostatic Postural Assessment:  No new        ROM:  Full R knee ROM  Strength:  Knee extension: 5/5, knee flexion: 5/5        Not tested due to recent fracture. Functional Mobility:         Gait/Ambulation:  Ambulating into clinic in brace with WBAT. Balance:       Single leg balance 30\"   Body Structures Involved:  1. Bones  2. Joints  3. Muscles  4. Ligaments Body Functions Affected:  1. Neuromusculoskeletal  2. Movement Related Activities and Participation Affected:  1. Mobility   CLINICAL DECISION MAKING:   Outcome Measure: Tool Used: Lower Extremity Functional Scale (LEFS)  Score:  Initial: 16/80 Most Recent: X/80 (Date: -- )   Interpretation of Score: 20 questions each scored on a 5 point scale with 0 representing \"extreme difficulty or unable to perform\" and 4 representing \"no difficulty\". The lower the score, the greater the functional disability. 80/80 represents no disability. Minimal detectable change is 9 points. Score 80 79-63 62-48 47-32 31-16 15-1 0   Modifier CH CI CJ CK CL CM CN     Medical Necessity:   · Patient is expected to demonstrate progress in strength, range of motion, balance, coordination and functional technique to return to independent gait and activities. .  Reason for Services/Other Comments:  · Patient continues to require skilled intervention due to need to progress to full function .

## 2019-01-29 ENCOUNTER — HOSPITAL ENCOUNTER (OUTPATIENT)
Dept: LAB | Age: 64
Discharge: HOME | End: 2019-01-29

## 2019-01-29 DIAGNOSIS — R79.89 ABNORMAL CBC: ICD-10-CM

## 2019-04-02 ENCOUNTER — HOSPITAL ENCOUNTER (OUTPATIENT)
Dept: LAB | Age: 64
Discharge: HOME | End: 2019-04-02

## 2019-04-02 DIAGNOSIS — R89.9 ABNORMAL LABORATORY TEST: ICD-10-CM

## 2019-07-30 ENCOUNTER — HOSPITAL ENCOUNTER (OUTPATIENT)
Dept: LAB | Age: 64
Discharge: HOME | End: 2019-07-30

## 2019-07-30 DIAGNOSIS — Z85.46 HX OF MALIGNANT NEOPLASM OF PROSTATE: ICD-10-CM

## 2019-07-30 DIAGNOSIS — R89.9 ABNORMAL LABORATORY TEST: ICD-10-CM

## 2020-01-24 ENCOUNTER — HOSPITAL ENCOUNTER (OUTPATIENT)
Dept: LAB | Age: 65
Discharge: HOME | End: 2020-01-24
Payer: MEDICARE

## 2020-01-24 DIAGNOSIS — Z85.46 HX OF MALIGNANT NEOPLASM OF PROSTATE: ICD-10-CM

## 2020-01-24 DIAGNOSIS — R89.9 ABNORMAL LABORATORY TEST: ICD-10-CM

## 2020-01-24 LAB
BASOPHILS # BLD: 0.1 K/UL (ref 0–0.2)
BASOPHILS NFR BLD: 2 % (ref 0–2)
DIFFERENTIAL METHOD BLD: ABNORMAL
EOSINOPHIL # BLD: 0.1 K/UL (ref 0–0.8)
EOSINOPHIL NFR BLD: 2 % (ref 0.5–7.8)
ERYTHROCYTE [DISTWIDTH] IN BLOOD BY AUTOMATED COUNT: 17.2 % (ref 11.9–14.6)
HCT VFR BLD AUTO: 43.7 % (ref 41.1–50.3)
HGB BLD-MCNC: 14.7 G/DL (ref 13.6–17.2)
IMM GRANULOCYTES # BLD AUTO: 0.4 K/UL (ref 0–0.5)
IMM GRANULOCYTES NFR BLD AUTO: 5 % (ref 0–5)
LYMPHOCYTES # BLD: 1.9 K/UL (ref 0.5–4.6)
LYMPHOCYTES NFR BLD: 26 % (ref 13–44)
MCH RBC QN AUTO: 30.1 PG (ref 26.1–32.9)
MCHC RBC AUTO-ENTMCNC: 33.6 G/DL (ref 31.4–35)
MCV RBC AUTO: 89.4 FL (ref 79.6–97.8)
MONOCYTES # BLD: 0.8 K/UL (ref 0.1–1.3)
MONOCYTES NFR BLD: 11 % (ref 4–12)
NEUTS SEG # BLD: 4.1 K/UL (ref 1.7–8.2)
NEUTS SEG NFR BLD: 54 % (ref 43–78)
NRBC # BLD: 0 K/UL (ref 0–0.2)
PLATELET # BLD AUTO: 226 K/UL (ref 150–450)
PMV BLD AUTO: 9.4 FL (ref 9.4–12.3)
PSA SERPL-MCNC: 0.5 NG/ML
RBC # BLD AUTO: 4.89 M/UL (ref 4.23–5.67)
WBC # BLD AUTO: 7.5 K/UL (ref 4.3–11.1)

## 2020-01-24 PROCEDURE — 84153 ASSAY OF PSA TOTAL: CPT

## 2020-01-24 PROCEDURE — 36415 COLL VENOUS BLD VENIPUNCTURE: CPT

## 2020-01-24 PROCEDURE — 85025 COMPLETE CBC W/AUTO DIFF WBC: CPT

## 2020-07-22 ENCOUNTER — HOSPITAL ENCOUNTER (OUTPATIENT)
Dept: LAB | Age: 65
Discharge: HOME | End: 2020-07-22
Payer: MEDICARE

## 2020-07-22 DIAGNOSIS — Z85.46 HX OF MALIGNANT NEOPLASM OF PROSTATE: ICD-10-CM

## 2020-07-22 DIAGNOSIS — R89.9 ABNORMAL LABORATORY TEST: ICD-10-CM

## 2020-07-22 LAB
BASOPHILS # BLD: 0.2 K/UL (ref 0–0.2)
BASOPHILS NFR BLD: 2 % (ref 0–2)
DIFFERENTIAL METHOD BLD: ABNORMAL
EOSINOPHIL # BLD: 0.2 K/UL (ref 0–0.8)
EOSINOPHIL NFR BLD: 2 % (ref 0.5–7.8)
ERYTHROCYTE [DISTWIDTH] IN BLOOD BY AUTOMATED COUNT: 18 % (ref 11.9–14.6)
HCT VFR BLD AUTO: 42.3 % (ref 41.1–50.3)
HGB BLD-MCNC: 14.1 G/DL (ref 13.6–17.2)
IMM GRANULOCYTES # BLD AUTO: 0.5 K/UL (ref 0–0.5)
IMM GRANULOCYTES NFR BLD AUTO: 7 % (ref 0–5)
LYMPHOCYTES # BLD: 2 K/UL (ref 0.5–4.6)
LYMPHOCYTES NFR BLD: 26 % (ref 13–44)
MCH RBC QN AUTO: 30.2 PG (ref 26.1–32.9)
MCHC RBC AUTO-ENTMCNC: 33.3 G/DL (ref 31.4–35)
MCV RBC AUTO: 90.6 FL (ref 79.6–97.8)
MONOCYTES # BLD: 0.9 K/UL (ref 0.1–1.3)
MONOCYTES NFR BLD: 12 % (ref 4–12)
NEUTS SEG # BLD: 3.7 K/UL (ref 1.7–8.2)
NEUTS SEG NFR BLD: 51 % (ref 43–78)
NRBC # BLD: 0 K/UL (ref 0–0.2)
PLATELET # BLD AUTO: 249 K/UL (ref 150–450)
PLATELET COMMENTS,PCOM: ADEQUATE
PMV BLD AUTO: 9.7 FL (ref 9.4–12.3)
PSA SERPL-MCNC: 0.6 NG/ML
RBC # BLD AUTO: 4.67 M/UL (ref 4.23–5.67)
RBC MORPH BLD: ABNORMAL
WBC # BLD AUTO: 7.5 K/UL (ref 4.3–11.1)
WBC MORPH BLD: ABNORMAL

## 2020-07-22 PROCEDURE — 36415 COLL VENOUS BLD VENIPUNCTURE: CPT

## 2020-07-22 PROCEDURE — 85025 COMPLETE CBC W/AUTO DIFF WBC: CPT

## 2020-07-22 PROCEDURE — 84153 ASSAY OF PSA TOTAL: CPT

## 2020-07-31 ENCOUNTER — HOSPITAL ENCOUNTER (OUTPATIENT)
Dept: LAB | Age: 65
Discharge: HOME | End: 2020-07-31
Payer: MEDICARE

## 2020-07-31 DIAGNOSIS — R89.9 ABNORMAL LABORATORY TEST: ICD-10-CM

## 2020-07-31 DIAGNOSIS — D72.0: ICD-10-CM

## 2020-07-31 LAB
BASOPHILS # BLD: 0.1 K/UL (ref 0–0.2)
BASOPHILS NFR BLD: 1 % (ref 0–2)
DIFFERENTIAL METHOD BLD: ABNORMAL
EOSINOPHIL # BLD: 0.2 K/UL (ref 0–0.8)
EOSINOPHIL NFR BLD: 2 % (ref 0.5–7.8)
ERYTHROCYTE [DISTWIDTH] IN BLOOD BY AUTOMATED COUNT: 17.9 % (ref 11.9–14.6)
HCT VFR BLD AUTO: 43.4 % (ref 41.1–50.3)
HGB BLD-MCNC: 14.8 G/DL (ref 13.6–17.2)
IMM GRANULOCYTES # BLD AUTO: 0.5 K/UL (ref 0–0.5)
IMM GRANULOCYTES NFR BLD AUTO: 6 % (ref 0–5)
LYMPHOCYTES # BLD: 0.7 K/UL (ref 0.5–4.6)
LYMPHOCYTES NFR BLD: 9 % (ref 13–44)
Lab: NORMAL
Lab: NORMAL
MCH RBC QN AUTO: 30.6 PG (ref 26.1–32.9)
MCHC RBC AUTO-ENTMCNC: 34.1 G/DL (ref 31.4–35)
MCV RBC AUTO: 89.7 FL (ref 79.6–97.8)
MONOCYTES # BLD: 0.7 K/UL (ref 0.1–1.3)
MONOCYTES NFR BLD: 9 % (ref 4–12)
NEUTS SEG # BLD: 5.7 K/UL (ref 1.7–8.2)
NEUTS SEG NFR BLD: 73 % (ref 43–78)
NRBC # BLD: 0 K/UL (ref 0–0.2)
PLATELET # BLD AUTO: 224 K/UL (ref 150–450)
PLATELET COMMENTS,PCOM: ADEQUATE
PMV BLD AUTO: 9.6 FL (ref 9.4–12.3)
RBC # BLD AUTO: 4.84 M/UL (ref 4.23–5.67)
RBC MORPH BLD: ABNORMAL
REFERENCE LAB,REFLB: NORMAL
REFERENCE LAB,REFLB: NORMAL
TEST DESCRIPTION:,ATST: NORMAL
TEST DESCRIPTION:,ATST: NORMAL
WBC # BLD AUTO: 7.9 K/UL (ref 4.3–11.1)
WBC MORPH BLD: ABNORMAL

## 2020-07-31 PROCEDURE — 88185 FLOWCYTOMETRY/TC ADD-ON: CPT

## 2020-07-31 PROCEDURE — 85025 COMPLETE CBC W/AUTO DIFF WBC: CPT

## 2020-07-31 PROCEDURE — 88184 FLOWCYTOMETRY/ TC 1 MARKER: CPT

## 2020-07-31 PROCEDURE — 36415 COLL VENOUS BLD VENIPUNCTURE: CPT

## 2020-08-10 LAB
FLOW CYTOMETRY, FBTC1: NORMAL
SPECIMEN SOURCE: NORMAL
TEST ORDERED:: NORMAL

## 2020-09-23 ENCOUNTER — HOSPITAL ENCOUNTER (OUTPATIENT)
Dept: LAB | Age: 65
Discharge: HOME | End: 2020-09-23
Payer: MEDICARE

## 2020-09-23 DIAGNOSIS — D72.0: ICD-10-CM

## 2020-09-23 LAB
BASOPHILS # BLD: 0.1 K/UL (ref 0–0.2)
BASOPHILS NFR BLD: 1 % (ref 0–2)
DIFFERENTIAL METHOD BLD: ABNORMAL
EOSINOPHIL # BLD: 0.2 K/UL (ref 0–0.8)
EOSINOPHIL NFR BLD: 3 % (ref 0.5–7.8)
ERYTHROCYTE [DISTWIDTH] IN BLOOD BY AUTOMATED COUNT: 18.5 % (ref 11.9–14.6)
HCT VFR BLD AUTO: 40.6 % (ref 41.1–50.3)
HGB BLD-MCNC: 13.6 G/DL (ref 13.6–17.2)
IMM GRANULOCYTES # BLD AUTO: 0.4 K/UL (ref 0–0.5)
IMM GRANULOCYTES NFR BLD AUTO: 6 % (ref 0–5)
LYMPHOCYTES # BLD: 2.1 K/UL (ref 0.5–4.6)
LYMPHOCYTES NFR BLD: 29 % (ref 13–44)
MCH RBC QN AUTO: 30.4 PG (ref 26.1–32.9)
MCHC RBC AUTO-ENTMCNC: 33.5 G/DL (ref 31.4–35)
MCV RBC AUTO: 90.8 FL (ref 79.6–97.8)
MONOCYTES # BLD: 0.8 K/UL (ref 0.1–1.3)
MONOCYTES NFR BLD: 11 % (ref 4–12)
NEUTS SEG # BLD: 3.5 K/UL (ref 1.7–8.2)
NEUTS SEG NFR BLD: 50 % (ref 43–78)
NRBC # BLD: 0 K/UL (ref 0–0.2)
PLATELET # BLD AUTO: 248 K/UL (ref 150–450)
PLATELET COMMENTS,PCOM: ADEQUATE
PMV BLD AUTO: 9.7 FL (ref 9.4–12.3)
RBC # BLD AUTO: 4.47 M/UL (ref 4.23–5.67)
RBC MORPH BLD: ABNORMAL
RBC MORPH BLD: ABNORMAL
WBC # BLD AUTO: 7.1 K/UL (ref 4.3–11.1)
WBC MORPH BLD: ABNORMAL

## 2020-09-23 PROCEDURE — 88305 TISSUE EXAM BY PATHOLOGIST: CPT

## 2020-09-23 PROCEDURE — 88313 SPECIAL STAINS GROUP 2: CPT

## 2020-09-23 PROCEDURE — 36415 COLL VENOUS BLD VENIPUNCTURE: CPT

## 2020-09-23 PROCEDURE — 88184 FLOWCYTOMETRY/ TC 1 MARKER: CPT

## 2020-09-23 PROCEDURE — 88185 FLOWCYTOMETRY/TC ADD-ON: CPT

## 2020-09-23 PROCEDURE — 88311 DECALCIFY TISSUE: CPT

## 2020-09-23 PROCEDURE — 85025 COMPLETE CBC W/AUTO DIFF WBC: CPT

## 2020-10-12 LAB
FLOW CYTOMETRY, FBTC1: NORMAL
SPECIMEN SOURCE: NORMAL
TEST ORDERED:: NORMAL

## 2021-01-08 ENCOUNTER — HOSPITAL ENCOUNTER (OUTPATIENT)
Dept: LAB | Age: 66
Discharge: HOME | End: 2021-01-08
Payer: MEDICARE

## 2021-01-08 DIAGNOSIS — Z85.46 HX OF MALIGNANT NEOPLASM OF PROSTATE: ICD-10-CM

## 2021-01-08 DIAGNOSIS — D46.9 MDS (MYELODYSPLASTIC SYNDROME) (HCC): ICD-10-CM

## 2021-01-08 DIAGNOSIS — R68.89 OTHER GENERAL SYMPTOMS AND SIGNS: ICD-10-CM

## 2021-01-08 LAB
ALBUMIN SERPL-MCNC: 3.9 G/DL (ref 3.2–4.6)
ALBUMIN/GLOB SERPL: 1.2 {RATIO} (ref 1.2–3.5)
ALP SERPL-CCNC: 80 U/L (ref 50–136)
ALT SERPL-CCNC: 25 U/L (ref 12–65)
ANION GAP SERPL CALC-SCNC: 7 MMOL/L (ref 7–16)
AST SERPL-CCNC: 18 U/L (ref 15–37)
BASOPHILS # BLD: 0.1 K/UL (ref 0–0.2)
BASOPHILS NFR BLD: 2 % (ref 0–2)
BILIRUB SERPL-MCNC: 0.7 MG/DL (ref 0.2–1.1)
BUN SERPL-MCNC: 19 MG/DL (ref 8–23)
CALCIUM SERPL-MCNC: 8.8 MG/DL (ref 8.3–10.4)
CHLORIDE SERPL-SCNC: 106 MMOL/L (ref 98–107)
CO2 SERPL-SCNC: 27 MMOL/L (ref 21–32)
CREAT SERPL-MCNC: 1 MG/DL (ref 0.8–1.5)
DIFFERENTIAL METHOD BLD: ABNORMAL
EOSINOPHIL # BLD: 0.3 K/UL (ref 0–0.8)
EOSINOPHIL NFR BLD: 4 % (ref 0.5–7.8)
ERYTHROCYTE [DISTWIDTH] IN BLOOD BY AUTOMATED COUNT: 17.5 % (ref 11.9–14.6)
FOLATE SERPL-MCNC: 28.8 NG/ML (ref 3.1–17.5)
GLOBULIN SER CALC-MCNC: 3.2 G/DL (ref 2.3–3.5)
GLUCOSE SERPL-MCNC: 93 MG/DL (ref 65–100)
HCT VFR BLD AUTO: 42.8 % (ref 41.1–50.3)
HGB BLD-MCNC: 14.1 G/DL (ref 13.6–17.2)
IMM GRANULOCYTES # BLD AUTO: 0.3 K/UL (ref 0–0.5)
IMM GRANULOCYTES NFR BLD AUTO: 4 % (ref 0–5)
LYMPHOCYTES # BLD: 2.1 K/UL (ref 0.5–4.6)
LYMPHOCYTES NFR BLD: 28 % (ref 13–44)
MCH RBC QN AUTO: 29.6 PG (ref 26.1–32.9)
MCHC RBC AUTO-ENTMCNC: 32.9 G/DL (ref 31.4–35)
MCV RBC AUTO: 89.7 FL (ref 79.6–97.8)
MONOCYTES # BLD: 0.9 K/UL (ref 0.1–1.3)
MONOCYTES NFR BLD: 12 % (ref 4–12)
NEUTS SEG # BLD: 3.8 K/UL (ref 1.7–8.2)
NEUTS SEG NFR BLD: 50 % (ref 43–78)
NRBC # BLD: 0 K/UL (ref 0–0.2)
PLATELET # BLD AUTO: 232 K/UL (ref 150–450)
PMV BLD AUTO: 9.4 FL (ref 9.4–12.3)
POTASSIUM SERPL-SCNC: 4.2 MMOL/L (ref 3.5–5.1)
PROT SERPL-MCNC: 7.1 G/DL (ref 6.3–8.2)
PSA SERPL-MCNC: 0.9 NG/ML
RBC # BLD AUTO: 4.77 M/UL (ref 4.23–5.67)
SODIUM SERPL-SCNC: 140 MMOL/L (ref 136–145)
VIT B12 SERPL-MCNC: 744 PG/ML (ref 193–986)
WBC # BLD AUTO: 7.5 K/UL (ref 4.3–11.1)

## 2021-01-08 PROCEDURE — 82746 ASSAY OF FOLIC ACID SERUM: CPT

## 2021-01-08 PROCEDURE — 80053 COMPREHEN METABOLIC PANEL: CPT

## 2021-01-08 PROCEDURE — 82607 VITAMIN B-12: CPT

## 2021-01-08 PROCEDURE — 82525 ASSAY OF COPPER: CPT

## 2021-01-08 PROCEDURE — 84153 ASSAY OF PSA TOTAL: CPT

## 2021-01-08 PROCEDURE — 36415 COLL VENOUS BLD VENIPUNCTURE: CPT

## 2021-01-08 PROCEDURE — 85025 COMPLETE CBC W/AUTO DIFF WBC: CPT

## 2021-01-12 LAB — COPPER SERPL-MCNC: 72 UG/DL (ref 69–132)

## 2021-09-10 ENCOUNTER — HOSPITAL ENCOUNTER (OUTPATIENT)
Dept: LAB | Age: 66
Discharge: HOME | End: 2021-09-10
Payer: MEDICARE

## 2021-09-10 DIAGNOSIS — D46.9 MDS (MYELODYSPLASTIC SYNDROME) (HCC): ICD-10-CM

## 2021-09-10 DIAGNOSIS — Z85.46 HX OF MALIGNANT NEOPLASM OF PROSTATE: ICD-10-CM

## 2021-09-10 LAB
ALBUMIN SERPL-MCNC: 3.7 G/DL (ref 3.2–4.6)
ALBUMIN/GLOB SERPL: 1.1 {RATIO} (ref 1.2–3.5)
ALP SERPL-CCNC: 86 U/L (ref 50–136)
ALT SERPL-CCNC: 32 U/L (ref 12–65)
ANION GAP SERPL CALC-SCNC: 8 MMOL/L (ref 7–16)
AST SERPL-CCNC: 27 U/L (ref 15–37)
BASOPHILS # BLD: 0.1 K/UL (ref 0–0.2)
BASOPHILS NFR BLD: 1 % (ref 0–2)
BILIRUB SERPL-MCNC: 0.6 MG/DL (ref 0.2–1.1)
BUN SERPL-MCNC: 14 MG/DL (ref 8–23)
CALCIUM SERPL-MCNC: 8.7 MG/DL (ref 8.3–10.4)
CHLORIDE SERPL-SCNC: 107 MMOL/L (ref 98–107)
CO2 SERPL-SCNC: 24 MMOL/L (ref 21–32)
CREAT SERPL-MCNC: 0.8 MG/DL (ref 0.8–1.5)
DIFFERENTIAL METHOD BLD: ABNORMAL
EOSINOPHIL # BLD: 0.1 K/UL (ref 0–0.8)
EOSINOPHIL NFR BLD: 1 % (ref 0.5–7.8)
ERYTHROCYTE [DISTWIDTH] IN BLOOD BY AUTOMATED COUNT: 18.4 % (ref 11.9–14.6)
GLOBULIN SER CALC-MCNC: 3.4 G/DL (ref 2.3–3.5)
GLUCOSE SERPL-MCNC: 102 MG/DL (ref 65–100)
HCT VFR BLD AUTO: 41.2 %
HGB BLD-MCNC: 14 G/DL (ref 13.6–17.2)
IMM GRANULOCYTES # BLD AUTO: 0.4 K/UL (ref 0–0.5)
IMM GRANULOCYTES NFR BLD AUTO: 6 % (ref 0–5)
LYMPHOCYTES # BLD: 1.8 K/UL (ref 0.5–4.6)
LYMPHOCYTES NFR BLD: 27 % (ref 13–44)
MCH RBC QN AUTO: 30.2 PG (ref 26.1–32.9)
MCHC RBC AUTO-ENTMCNC: 34 G/DL (ref 31.4–35)
MCV RBC AUTO: 88.8 FL (ref 79.6–97.8)
MONOCYTES # BLD: 0.7 K/UL (ref 0.1–1.3)
MONOCYTES NFR BLD: 11 % (ref 4–12)
NEUTS SEG # BLD: 3.4 K/UL (ref 1.7–8.2)
NEUTS SEG NFR BLD: 54 % (ref 43–78)
NRBC # BLD: 0 K/UL (ref 0–0.2)
PLATELET # BLD AUTO: 237 K/UL (ref 150–450)
PLATELET COMMENTS,PCOM: ADEQUATE
PMV BLD AUTO: 9.6 FL (ref 9.4–12.3)
POTASSIUM SERPL-SCNC: 3.9 MMOL/L (ref 3.5–5.1)
PROT SERPL-MCNC: 7.1 G/DL (ref 6.3–8.2)
PSA SERPL-MCNC: 0.8 NG/ML
RBC # BLD AUTO: 4.64 M/UL (ref 4.23–5.6)
RBC MORPH BLD: ABNORMAL
RBC MORPH BLD: ABNORMAL
SODIUM SERPL-SCNC: 139 MMOL/L (ref 136–145)
WBC # BLD AUTO: 6.5 K/UL (ref 4.3–11.1)
WBC MORPH BLD: ABNORMAL

## 2021-09-10 PROCEDURE — 36415 COLL VENOUS BLD VENIPUNCTURE: CPT

## 2021-09-10 PROCEDURE — 84153 ASSAY OF PSA TOTAL: CPT

## 2021-09-10 PROCEDURE — 80053 COMPREHEN METABOLIC PANEL: CPT

## 2021-09-10 PROCEDURE — 85025 COMPLETE CBC W/AUTO DIFF WBC: CPT
